# Patient Record
Sex: FEMALE | Race: WHITE | NOT HISPANIC OR LATINO | Employment: FULL TIME | URBAN - METROPOLITAN AREA
[De-identification: names, ages, dates, MRNs, and addresses within clinical notes are randomized per-mention and may not be internally consistent; named-entity substitution may affect disease eponyms.]

---

## 2022-10-17 ENCOUNTER — APPOINTMENT (OUTPATIENT)
Dept: ULTRASOUND IMAGING | Facility: CLINIC | Age: 57
End: 2022-10-17
Attending: EMERGENCY MEDICINE
Payer: COMMERCIAL

## 2022-10-17 ENCOUNTER — HOSPITAL ENCOUNTER (EMERGENCY)
Facility: CLINIC | Age: 57
Discharge: HOME | End: 2022-10-17
Attending: EMERGENCY MEDICINE | Admitting: EMERGENCY MEDICINE
Payer: COMMERCIAL

## 2022-10-17 VITALS
HEART RATE: 65 BPM | WEIGHT: 175 LBS | OXYGEN SATURATION: 99 % | DIASTOLIC BLOOD PRESSURE: 67 MMHG | BODY MASS INDEX: 26.52 KG/M2 | RESPIRATION RATE: 16 BRPM | SYSTOLIC BLOOD PRESSURE: 123 MMHG | TEMPERATURE: 97.5 F | HEIGHT: 68 IN

## 2022-10-17 DIAGNOSIS — R10.11 RUQ ABDOMINAL PAIN: ICD-10-CM

## 2022-10-17 DIAGNOSIS — N39.0 URINARY TRACT INFECTION WITHOUT HEMATURIA, SITE UNSPECIFIED: ICD-10-CM

## 2022-10-17 LAB
ALBUMIN SERPL-MCNC: 3.8 G/DL (ref 3.5–5)
ALBUMIN UR-MCNC: NEGATIVE MG/DL
ALP SERPL-CCNC: 127 U/L (ref 45–120)
ALT SERPL W P-5'-P-CCNC: 27 U/L (ref 0–45)
ANION GAP SERPL CALCULATED.3IONS-SCNC: 9 MMOL/L (ref 5–18)
APPEARANCE UR: CLEAR
AST SERPL W P-5'-P-CCNC: 15 U/L (ref 0–40)
BACTERIA #/AREA URNS HPF: ABNORMAL /HPF
BILIRUB DIRECT SERPL-MCNC: <0.1 MG/DL
BILIRUB SERPL-MCNC: 0.3 MG/DL (ref 0–1)
BILIRUB UR QL STRIP: NEGATIVE
BUN SERPL-MCNC: 19 MG/DL (ref 8–22)
CALCIUM SERPL-MCNC: 9 MG/DL (ref 8.5–10.5)
CHLORIDE BLD-SCNC: 107 MMOL/L (ref 98–107)
CO2 SERPL-SCNC: 25 MMOL/L (ref 22–31)
COLOR UR AUTO: COLORLESS
CREAT SERPL-MCNC: 0.82 MG/DL (ref 0.6–1.1)
ERYTHROCYTE [DISTWIDTH] IN BLOOD BY AUTOMATED COUNT: 14 % (ref 10–15)
GFR SERPL CREATININE-BSD FRML MDRD: 84 ML/MIN/1.73M2
GLUCOSE BLD-MCNC: 107 MG/DL (ref 70–125)
GLUCOSE UR STRIP-MCNC: NEGATIVE MG/DL
HCT VFR BLD AUTO: 38.8 % (ref 35–47)
HGB BLD-MCNC: 12.9 G/DL (ref 11.7–15.7)
HGB UR QL STRIP: NEGATIVE
KETONES UR STRIP-MCNC: NEGATIVE MG/DL
LEUKOCYTE ESTERASE UR QL STRIP: ABNORMAL
LIPASE SERPL-CCNC: 27 U/L (ref 0–52)
MCH RBC QN AUTO: 29.9 PG (ref 26.5–33)
MCHC RBC AUTO-ENTMCNC: 33.2 G/DL (ref 31.5–36.5)
MCV RBC AUTO: 90 FL (ref 78–100)
NITRATE UR QL: NEGATIVE
PH UR STRIP: 5 [PH] (ref 5–7)
PLATELET # BLD AUTO: 350 10E3/UL (ref 150–450)
POTASSIUM BLD-SCNC: 4.4 MMOL/L (ref 3.5–5)
PROT SERPL-MCNC: 7.5 G/DL (ref 6–8)
RBC # BLD AUTO: 4.32 10E6/UL (ref 3.8–5.2)
RBC URINE: 3 /HPF
SODIUM SERPL-SCNC: 141 MMOL/L (ref 136–145)
SP GR UR STRIP: 1.01 (ref 1–1.03)
SQUAMOUS EPITHELIAL: 4 /HPF
UROBILINOGEN UR STRIP-MCNC: <2 MG/DL
WBC # BLD AUTO: 8.8 10E3/UL (ref 4–11)
WBC URINE: 38 /HPF

## 2022-10-17 PROCEDURE — 76705 ECHO EXAM OF ABDOMEN: CPT

## 2022-10-17 PROCEDURE — 87086 URINE CULTURE/COLONY COUNT: CPT | Performed by: EMERGENCY MEDICINE

## 2022-10-17 PROCEDURE — 96375 TX/PRO/DX INJ NEW DRUG ADDON: CPT

## 2022-10-17 PROCEDURE — 81001 URINALYSIS AUTO W/SCOPE: CPT | Performed by: EMERGENCY MEDICINE

## 2022-10-17 PROCEDURE — 83690 ASSAY OF LIPASE: CPT | Performed by: EMERGENCY MEDICINE

## 2022-10-17 PROCEDURE — 85014 HEMATOCRIT: CPT | Performed by: EMERGENCY MEDICINE

## 2022-10-17 PROCEDURE — 250N000011 HC RX IP 250 OP 636: Performed by: EMERGENCY MEDICINE

## 2022-10-17 PROCEDURE — 36415 COLL VENOUS BLD VENIPUNCTURE: CPT | Performed by: EMERGENCY MEDICINE

## 2022-10-17 PROCEDURE — 96374 THER/PROPH/DIAG INJ IV PUSH: CPT

## 2022-10-17 PROCEDURE — 99285 EMERGENCY DEPT VISIT HI MDM: CPT | Mod: 25

## 2022-10-17 PROCEDURE — 82248 BILIRUBIN DIRECT: CPT | Performed by: EMERGENCY MEDICINE

## 2022-10-17 RX ORDER — ONDANSETRON 2 MG/ML
4 INJECTION INTRAMUSCULAR; INTRAVENOUS ONCE
Status: COMPLETED | OUTPATIENT
Start: 2022-10-17 | End: 2022-10-17

## 2022-10-17 RX ORDER — FAMOTIDINE 20 MG/1
20 TABLET, FILM COATED ORAL 2 TIMES DAILY
Qty: 20 TABLET | Refills: 0 | Status: SHIPPED | OUTPATIENT
Start: 2022-10-17 | End: 2023-02-13

## 2022-10-17 RX ORDER — CIPROFLOXACIN 500 MG/1
500 TABLET, FILM COATED ORAL 2 TIMES DAILY
Qty: 14 TABLET | Refills: 0 | Status: SHIPPED | OUTPATIENT
Start: 2022-10-17 | End: 2022-10-24

## 2022-10-17 RX ORDER — MORPHINE SULFATE 4 MG/ML
4 INJECTION, SOLUTION INTRAMUSCULAR; INTRAVENOUS ONCE
Status: COMPLETED | OUTPATIENT
Start: 2022-10-17 | End: 2022-10-17

## 2022-10-17 RX ADMIN — MORPHINE SULFATE 4 MG: 4 INJECTION, SOLUTION INTRAMUSCULAR; INTRAVENOUS at 05:59

## 2022-10-17 RX ADMIN — ONDANSETRON 4 MG: 2 INJECTION INTRAMUSCULAR; INTRAVENOUS at 05:59

## 2022-10-17 ASSESSMENT — ACTIVITIES OF DAILY LIVING (ADL): ADLS_ACUITY_SCORE: 35

## 2022-10-17 NOTE — ED PROVIDER NOTES
EMERGENCY DEPARTMENT ENCOUnter      NAME: Nan Sánchez  AGE: 57 year old female  YOB: 1965  MRN: [id number]  EVALUATION DATE & TIME: 10/17/2022  5:29 AM    PCP: No Ref-Primary, Physician    ED PROVIDER: Mohinder Deras DO      Chief Complaint   Patient presents with     Rib Pain         FINAL IMPRESSION:  1. RUQ abdominal pain          ED COURSE & MEDICAL DECISION MAKIN:39 AM I met with the patient to gather history and to perform my initial exam. I discussed the plan for care while in the Emergency Department.  6:00 AM Patient is signed out to shift change provider pending workup.     The patient presented to the emergency department today with complaints of right upper quadrant pain for the past day.  She has moderate tenderness to this area on exam.  Laboratory tests and ultrasound have been ordered and are pending at the time of signout to the oncoming provider.  See their note for further details.      Medical Decision Making    Supplemental history from: N/A    External Record(s) Reviewed: N/A    Differential Diagnosis: See MDM charting for differential considered.     I performed an independent interpretation of the: N/A    Discussed with radiology regarding test interpretation: N/A    Discussion of management with another provider: N/A    The following testing was considered but ultimately not selected: None    I considered prescription management with: N/A    The patient's care impacted: None    Consideration of Admission/Observation: N/A - Patient admitted or discharged without consideration for admission    Care significantly affected by Social Determinants of Health including: N/A       At the conclusion of the encounter I discussed the results of all of the tests and the disposition. The questions were answered. The patient or family acknowledged understanding and was agreeable with the care plan.         MEDICATIONS GIVEN IN THE EMERGENCY:  Medications   morphine (PF) injection  "4 mg (has no administration in time range)   ondansetron (ZOFRAN) injection 4 mg (has no administration in time range)         =================================================================    HPI        Nan Sánchez is a 57 year old female with no recorded pertinent history at this time who presents to this ED by walk in for evaluation of rib pain. Patient presents with right rib pain that has worsened in the past 24 hours. Pain is constant and is not affected by eating or deep inspirations. Denies history of abdominal surgeries or allergies to medications. Patient denies cough, fever, nausea, vomiting, new back pain, shoulder pain, or any additional complaints at this time.     REVIEW OF SYSTEMS     Constitutional:  Denies fever or chills  HENT:  Denies sore throat   Respiratory:  Denies cough or shortness of breath   Cardiovascular:  Denies chest pain or palpitations  GI:  Denies abdominal pain, nausea, or vomiting  Musculoskeletal:  Denies any new extremity pain. Endorses rib pain.    Skin:  Denies rash   Neurologic:  Denies headache, focal weakness or sensory changes    All other systems reviewed and are negative      PAST MEDICAL HISTORY:  History reviewed. No pertinent past medical history.    PAST SURGICAL HISTORY:  History reviewed. No pertinent surgical history.        CURRENT MEDICATIONS:    No current outpatient medications on file.      ALLERGIES:  No Known Allergies    FAMILY HISTORY:  History reviewed. No pertinent family history.    SOCIAL HISTORY:        VITALS:  Patient Vitals for the past 24 hrs:   BP Temp Temp src Pulse Resp SpO2 Height Weight   10/17/22 0526 (!) 143/73 97.5  F (36.4  C) Oral 72 16 96 % 1.727 m (5' 8\") 79.4 kg (175 lb)       PHYSICAL EXAM    Constitutional:  Well developed, Well nourished, Uncomfortable appearing.   HENT:  Normocephalic, Atraumatic, Bilateral external ears normal, Oropharynx moist, Nose normal.   Neck:  Normal range of motion, No meningismus, No stridor. "   Eyes:  EOMI, Conjunctiva normal, No discharge.   Respiratory:  Normal breath sounds, No respiratory distress, No wheezing, No chest tenderness.   Cardiovascular:  Normal heart rate, Normal rhythm, No murmurs  GI:  Soft, Moderate RUQ tenderness, No lower abdominal tenderness, No guarding, No CVA tenderness.   Musculoskeletal:  No tenderness to palpation or major deformities noted.   Integument:  Warm, Dry, No erythema, No rash.   Neurologic:  Alert & oriented x 3, Normal motor function, Normal sensory function, No focal deficits noted.   Psychiatric:  Affect normal, Judgment normal, Mood normal.            I, Saran Montana, am serving as a scribe to document services personally performed by Dr. Deras based on my observation and the provider's statements to me. I, Mohinder Deras, DO attest that Saran Montana is acting in a scribe capacity, has observed my performance of the services and has documented them in accordance with my direction.    Mohinder Deras DO  Emergency Medicine  [facility] EMERGENCY ROOM  [address] [facility] [id number] [phone]  Dept: [phone]     Mohinder Deras MD  10/17/22 0682

## 2022-10-17 NOTE — ED NOTES
EMERGENCY DEPARTMENT SIGN OUT NOTE        ED COURSE AND MEDICAL DECISION MAKING  Patient was signed out to me by Dr Mohinder Deras at 6:00 AM    In brief, Nan Sánchez is a 57 year old female who initially presented with right rib pain worsening over the past 24 hours. Pain is constant with no aggravating or alleviating factors, no history of abdominal surgeries.     At time of sign out, disposition was pending right upper quadrant ultrasound labs and urinalysis.  Patient was reevaluated here and was resting much more comfortably after receiving some IV analgesics.  Her right upper quadrant ultrasound was unremarkable.  Labs including liver function tests, white blood cell count, hemoglobin, and metabolic profile are stable and reassuring.  Urinalysis does show some pyuria and minimal hematuria.  We discussed possibility of CT imaging to further evaluate for anything like appendicitis or kidney stone.  These do not seem to be as likely based on the locality of her pain.  I did suggest that given the pyuria we probably ought to treat her for a cystitis/possible pyelonephritis and she was in agreement with this.  She would rather hold off on the CT scan at this point citing concerns about cost and insurance and I think this is not unreasonable.  With this knowledge we did discuss return to the emergency department if there are new concerns about increasing pain, fever, tractable vomiting, etc.  Patient should follow-up in clinic to review ongoing symptoms by next week.  She was discharged in stable condition peer    FINAL IMPRESSION    1. RUQ abdominal pain    2. Urinary tract infection without hematuria, site unspecified        ED MEDS  Medications   morphine (PF) injection 4 mg (4 mg Intravenous Given 10/17/22 0559)   ondansetron (ZOFRAN) injection 4 mg (4 mg Intravenous Given 10/17/22 0559)       LAB  Labs Ordered and Resulted from Time of ED Arrival to Time of ED Departure   HEPATIC FUNCTION PANEL - Abnormal        Result Value    Bilirubin Total 0.3      Bilirubin Direct <0.1      Protein Total 7.5      Albumin 3.8      Alkaline Phosphatase 127 (*)     AST 15      ALT 27     ROUTINE UA WITH MICROSCOPIC REFLEX TO CULTURE - Abnormal    Color Urine Colorless      Appearance Urine Clear      Glucose Urine Negative      Bilirubin Urine Negative      Ketones Urine Negative      Specific Gravity Urine 1.012      Blood Urine Negative      pH Urine 5.0      Protein Albumin Urine Negative      Urobilinogen Urine <2.0      Nitrite Urine Negative      Leukocyte Esterase Urine 500 Sang/uL (*)     Bacteria Urine Few (*)     RBC Urine 3 (*)     WBC Urine 38 (*)     Squamous Epithelials Urine 4 (*)    CBC WITH PLATELETS - Normal    WBC Count 8.8      RBC Count 4.32      Hemoglobin 12.9      Hematocrit 38.8      MCV 90      MCH 29.9      MCHC 33.2      RDW 14.0      Platelet Count 350     BASIC METABOLIC PANEL - Normal    Sodium 141      Potassium 4.4      Chloride 107      Carbon Dioxide (CO2) 25      Anion Gap 9      Urea Nitrogen 19      Creatinine 0.82      Calcium 9.0      Glucose 107      GFR Estimate 84     LIPASE - Normal    Lipase 27     URINE CULTURE         RADIOLOGY    US Abdomen Limited   Final Result   IMPRESSION:   1.  Diffuse increased echogenicity in the liver consistent with hepatic steatosis. No focal mass or biliary dilatation.   2.  Normal sonographic appearance of the gallbladder and visualized pancreas. No ascites. No hydronephrosis in the right kidney.                DISCHARGE MEDS  Discharge Medication List as of 10/17/2022  8:48 AM      START taking these medications    Details   ciprofloxacin (CIPRO) 500 MG tablet Take 1 tablet (500 mg) by mouth 2 times daily for 7 days, Disp-14 tablet, R-0, Local Print      famotidine (PEPCID) 20 MG tablet Take 1 tablet (20 mg) by mouth 2 times daily, Disp-20 tablet, R-0, Local Print             Osmel Howell MD  [facility] EMERGENCY ROOM  [address]  [facility] [id number]  [phone]      Osmel Howell MD  10/17/22 8571

## 2022-10-17 NOTE — DISCHARGE INSTRUCTIONS
You were seen in the emergency department at [facility] for right-sided abdominal pain and lower rib pain.  Your evaluation included an ultrasound of her gallbladder which looked normal.  It also included extensive lab studies which have all been reassuring so far.  Your urine testing did show some signs of infection and we would like to treat you with a few days of antibiotics for a possible kidney/bladder infection which could be causing your abdominal pain.  We would also like you to continue using Tylenol and ibuprofen every 6 hours for pain and we are going to prescribe you an antacid called famotidine to help with any possible contribution of stomach acid.  If symptoms significantly worsen within the next day or 2, we need to reevaluate you in the emergency department.  If things are not getting better within the next week or 2 please contact your primary doctor and follow-up with them upon your return to [place].

## 2022-10-17 NOTE — ED TRIAGE NOTES
Pt states that yesterday evening she started to feel pain under her rib. Denies abdomen pain or chest pain. Denies injury. States the pain has increased throughout the night. Denies nausea or dysuria.

## 2022-10-18 LAB — BACTERIA UR CULT: NO GROWTH

## 2023-02-13 ENCOUNTER — HOSPITAL ENCOUNTER (OUTPATIENT)
Dept: CT IMAGING | Facility: CLINIC | Age: 58
Discharge: HOME | End: 2023-02-13
Attending: PHYSICIAN ASSISTANT
Payer: COMMERCIAL

## 2023-02-13 ENCOUNTER — OFFICE VISIT (OUTPATIENT)
Dept: FAMILY MEDICINE | Facility: CLINIC | Age: 58
End: 2023-02-13
Payer: COMMERCIAL

## 2023-02-13 VITALS
HEART RATE: 78 BPM | BODY MASS INDEX: 27.22 KG/M2 | DIASTOLIC BLOOD PRESSURE: 84 MMHG | TEMPERATURE: 98.1 F | OXYGEN SATURATION: 99 % | WEIGHT: 179 LBS | SYSTOLIC BLOOD PRESSURE: 119 MMHG | RESPIRATION RATE: 16 BRPM
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
## 2023-02-13 DIAGNOSIS — S06.0X0A CONCUSSION WITHOUT LOSS OF CONSCIOUSNESS, INITIAL ENCOUNTER: Primary | ICD-10-CM

## 2023-02-13 PROCEDURE — 70450 CT HEAD/BRAIN W/O DYE: CPT

## 2023-02-13 PROCEDURE — 99214 OFFICE O/P EST MOD 30 MIN: CPT | Performed by: PHYSICIAN ASSISTANT

## 2023-02-13 PROCEDURE — 72125 CT NECK SPINE W/O DYE: CPT

## 2023-02-13 RX ORDER — BETAMETHASONE VALERATE 1.2 MG/G
AEROSOL, FOAM TOPICAL
COMMUNITY
Start: 2022-06-02 | End: 2024-08-14

## 2023-02-13 RX ORDER — HYDROCODONE BITARTRATE AND ACETAMINOPHEN 10; 325 MG/1; MG/1
TABLET ORAL
COMMUNITY
Start: 2022-12-15 | End: 2024-08-12

## 2023-02-13 RX ORDER — GABAPENTIN 100 MG/1
CAPSULE ORAL AT BEDTIME
COMMUNITY
Start: 2022-07-14 | End: 2024-08-14

## 2023-02-13 NOTE — PATIENT INSTRUCTIONS
We believe a concussion is the cause of your headache and symptoms today.   This is a clinical diagnosis.      For your pain, please use Ibuprofen 600mg every 6 hours as needed for pain.  You may also take Tylenol 500 mg every 4-6 hours as needed for additional pain relief.    Concerning signs and symptoms to watch for: severe headache, persistent nausea/vomiting, not acting right, new weakness in any extremity, facial asymmetry, or speech difficulties.      Cognitive rest is important following a concussion.  Limit anything requiring prolonged concentration. This can include reading, video games, television, or any screen time including text messaging, social media. In addition, the patient should refrain from any contact sports until completely asymptomatic at rest or with activity for 1 week.      Please take your medicines as recommended above and review the discharge instructions for concerning signs/symptoms that would require your prompt return to the clinic/emergency department for further evaluation. Please follow up in clinic as we have recommended below.      Discharge Instructions for Concussion  You have been diagnosed with a concussion, a type of brain injury caused by a sudden impact to your head. It can also be caused by sudden movement of your brain inside your head, such as from forceful shaking. Some concussions are mild. Most people recover completely from mild concussions. But recovery may take days, weeks, or months. For some, symptoms may last even longer. Early care and monitoring are important to prevent long-term complications.  Home care  Do's and don'ts:   Ask a friend or family member to stay with you for a few days. You should not be alone until you know how the injury has affected you.  Tell your caregiver to wake you every 2 to 3 hours during the first night. Your caregiver should call 911 if he or she can t wake you, or if you are confused.  Don t take any medicine--not even  aspirin--unless your healthcare provider says it's OK. If you have a headache, try placing a cold, damp cloth on your forehead.  Eat light. Clear liquids, such as broth or gelatin, are a good choice.  Don't drink alcohol or use any recreational drugs.   Don't return to sports or any activity that could cause you to hit your head until all symptoms are gone and you have been cleared by your doctor. A second head injury before full recovery from the first one can lead to serious brain injury.  Avoid activities that require a lot of concentration or attention. This will allow your brain to rest and heal more quickly.  The best way to recover is to discuss symptoms with your healthcare provider and your family. Work closely with your healthcare provider and give your brain time to heal.  Follow-up care  Follow up with your healthcare provider, or as advised.      When to call your healthcare provider  Your caregiver should call 911 right away if you have fallen asleep, cannot be awakened, or you are confused.  Otherwise, call your healthcare provider right away if any of these occur:  Vomiting  Clear or bloody drainage from your nose or ear  Constant drowsiness or trouble waking up  Confusion or memory loss  Blurred vision  Trouble walking, talking, or concentrating  Increased weakness or problems with coordination  Constant headache that can t be relieved or gets worse  Changes in behavior or personality   Date Last Reviewed: 11/5/2015 2000-2016 [employer]. [address] [place] [address], [place] [address]. All rights reserved. This information is not intended as a substitute for professional medical care. Always follow your healthcare professional's instructions.

## 2023-02-13 NOTE — PROGRESS NOTES
Patient presents with:  Fall: Patient fell x2 days ago on ice and hit head, left arm pain and dizziness        Clinical Decision Making:  Patient had fall 2 days ago.  Patient now experiencing difficulty with balance.  Romberg was mildly positive and patient struggled to do tandem walking.  Due to these coordination deficits I did recommend CT today.  CT of head and neck are neg for acute traumatic changes. Suspect mild concussion.      ICD-10-CM    1. Fall, initial encounter  W19.XXXA CT Head w/o Contrast     CT Cervical Spine w/o Contrast      2. Concussion without loss of consciousness, initial encounter  S06.0X0A           Patient Instructions     We believe a concussion is the cause of your headache and symptoms today.   This is a clinical diagnosis.      For your pain, please use Ibuprofen 600mg every 6 hours as needed for pain.  You may also take Tylenol 500 mg every 4-6 hours as needed for additional pain relief.    Concerning signs and symptoms to watch for: severe headache, persistent nausea/vomiting, not acting right, new weakness in any extremity, facial asymmetry, or speech difficulties.      Cognitive rest is important following a concussion.  Limit anything requiring prolonged concentration. This can include reading, video games, television, or any screen time including text messaging, social media. In addition, the patient should refrain from any contact sports until completely asymptomatic at rest or with activity for 1 week.      Please take your medicines as recommended above and review the discharge instructions for concerning signs/symptoms that would require your prompt return to the clinic/emergency department for further evaluation. Please follow up in clinic as we have recommended below.      Discharge Instructions for Concussion  You have been diagnosed with a concussion, a type of brain injury caused by a sudden impact to your head. It can also be caused by sudden movement of your brain  inside your head, such as from forceful shaking. Some concussions are mild. Most people recover completely from mild concussions. But recovery may take days, weeks, or months. For some, symptoms may last even longer. Early care and monitoring are important to prevent long-term complications.  Home care  Do's and don'ts:     Ask a friend or family member to stay with you for a few days. You should not be alone until you know how the injury has affected you.    Tell your caregiver to wake you every 2 to 3 hours during the first night. Your caregiver should call 911 if he or she can t wake you, or if you are confused.    Don t take any medicine--not even aspirin--unless your healthcare provider says it's OK. If you have a headache, try placing a cold, damp cloth on your forehead.    Eat light. Clear liquids, such as broth or gelatin, are a good choice.    Don't drink alcohol or use any recreational drugs.     Don't return to sports or any activity that could cause you to hit your head until all symptoms are gone and you have been cleared by your doctor. A second head injury before full recovery from the first one can lead to serious brain injury.    Avoid activities that require a lot of concentration or attention. This will allow your brain to rest and heal more quickly.  The best way to recover is to discuss symptoms with your healthcare provider and your family. Work closely with your healthcare provider and give your brain time to heal.  Follow-up care  Follow up with your healthcare provider, or as advised.      When to call your healthcare provider  Your caregiver should call 911 right away if you have fallen asleep, cannot be awakened, or you are confused.  Otherwise, call your healthcare provider right away if any of these occur:    Vomiting    Clear or bloody drainage from your nose or ear    Constant drowsiness or trouble waking up    Confusion or memory loss    Blurred vision    Trouble walking, talking, or  concentrating    Increased weakness or problems with coordination    Constant headache that can t be relieved or gets worse    Changes in behavior or personality   Date Last Reviewed: 11/5/2015 2000-2016 [employer]. [address] [place] [address], [place] [address]. All rights reserved. This information is not intended as a substitute for professional medical care. Always follow your healthcare professional's instructions.        HPI:  Nan Sánchez is a 57 year old female who presents today complaining of fall that occurred 2 days ago.  Patient slipped backwards while walking on ice and hit her head.  Patient also experiencing left arm discomfort.  Since the fall she has had intermittent dizziness that started last night and today. She felt out of body for a moment, but there was no LOC. No previous concussions. Patient has been taking Tylenol. She has a constant dull head and neck pain.     History obtained from the patient.    Problem List:  There are no relevant problems documented for this patient.      No past medical history on file.    Social History     Tobacco Use     Smoking status: Not on file     Smokeless tobacco: Not on file   Substance Use Topics     Alcohol use: Not on file       Review of Systems    Vitals:    02/13/23 1006   BP: 119/84   BP Location: Right arm   Patient Position: Sitting   Cuff Size: Adult Large   Pulse: 78   Resp: 16   Temp: 98.1  F (36.7  C)   TempSrc: Oral   SpO2: 99%   Weight: 81.2 kg (179 lb)       Physical Exam  Vitals and nursing note reviewed.   Constitutional:       General: She is not in acute distress.     Appearance: She is not toxic-appearing or diaphoretic.   HENT:      Head: Normocephalic and atraumatic.      Right Ear: Tympanic membrane, ear canal and external ear normal. No hemotympanum.      Left Ear: Tympanic membrane, ear canal and external ear normal. No hemotympanum.   Eyes:      Extraocular Movements: Extraocular movements intact.       Conjunctiva/sclera: Conjunctivae normal.      Pupils: Pupils are equal, round, and reactive to light.   Pulmonary:      Effort: Pulmonary effort is normal. No respiratory distress.   Musculoskeletal:      Comments: Patient moves her entire body to face me so that she does not need to turn her head to the right to make eye contact.  She reports this is due to her neck pain.   Skin:     Comments: Small scalp hematoma present on the left posterior part of the scalp.  No break in the skin.   Neurological:      Mental Status: She is alert.      GCS: GCS eye subscore is 4. GCS verbal subscore is 5. GCS motor subscore is 6.      Cranial Nerves: Cranial nerves 2-12 are intact. No cranial nerve deficit or facial asymmetry.      Sensory: Sensation is intact.      Motor: No weakness, tremor, atrophy, seizure activity or pronator drift.      Coordination: Romberg sign positive. Finger-Nose-Finger Test and Heel to Shin Test normal. Rapid alternating movements normal.      Gait: Tandem walk abnormal. Gait normal.   Psychiatric:         Mood and Affect: Mood normal.         Behavior: Behavior normal.         Thought Content: Thought content normal.         Judgment: Judgment normal.         Results:  Results for orders placed or performed during the hospital encounter of 02/13/23   CT Head w/o Contrast     Status: None    Narrative    EXAM: CT HEAD W/O CONTRAST  LOCATION: [facility]  DATE/TIME: 2/13/2023 11:12 AM    INDICATION: Head injury.  COMPARISON: None.  TECHNIQUE: Routine CT Head without IV contrast. Multiplanar reformats. Dose reduction techniques were used.    FINDINGS:  INTRACRANIAL CONTENTS: No intracranial hemorrhage, extraaxial collection, or mass effect.  No CT evidence of acute infarct. Normal parenchymal attenuation. Normal ventricles and sulci.     VISUALIZED ORBITS/SINUSES/MASTOIDS: No intraorbital abnormality. No paranasal sinus mucosal disease. No middle ear or mastoid  effusion.    BONES/SOFT TISSUES: No acute abnormality.      Impression    IMPRESSION:  1.  No acute intracranial process.   Results for orders placed or performed during the hospital encounter of 02/13/23   CT Cervical Spine w/o Contrast     Status: None    Narrative    EXAM: CT CERVICAL SPINE W/O CONTRAST  LOCATION: [facility]  DATE/TIME: 2/13/2023 11:12 AM    INDICATION: Neck pain; Trauma; Spondyloarthropathy  COMPARISON: None.  TECHNIQUE: Routine CT Cervical Spine without IV contrast. Multiplanar reformats. Dose reduction techniques were used.    FINDINGS:  VERTEBRA: Cervical vertebral body heights are maintained. The cervical spinal canal is mildly narrowed on a developmental basis. No acute cervical spine fracture.     CANAL/FORAMINA: Bilateral neural foraminal stenosis at C3-C4. Severe left neural foraminal stenosis at C4-C5. Severe left neural foraminal stenosis at C5-C6.    PARASPINAL: No extraspinal abnormality.      Impression    IMPRESSION:  1.  No acute cervical spine fracture.         At the end of the encounter, I discussed results, diagnosis, medications. Discussed red flags for immediate return to clinic/ER, as well as indications for follow up if no improvement. Patient understood and agreed to plan. Patient was stable for discharge.    30 minutes spent on the date of the encounter doing chart review, history and examination, documentation, and further activities as noted.

## 2023-07-06 ENCOUNTER — TELEPHONE (OUTPATIENT)
Dept: FAMILY MEDICINE | Facility: CLINIC | Age: 58
End: 2023-07-06

## 2023-07-06 ENCOUNTER — OFFICE VISIT (OUTPATIENT)
Dept: FAMILY MEDICINE | Facility: CLINIC | Age: 58
End: 2023-07-06
Payer: COMMERCIAL

## 2023-07-06 VITALS
WEIGHT: 180 LBS | TEMPERATURE: 97.8 F | RESPIRATION RATE: 16 BRPM | HEART RATE: 71 BPM | SYSTOLIC BLOOD PRESSURE: 113 MMHG | BODY MASS INDEX: 27.37 KG/M2 | DIASTOLIC BLOOD PRESSURE: 74 MMHG | OXYGEN SATURATION: 95 %

## 2023-07-06 DIAGNOSIS — L50.9 HIVES: Primary | ICD-10-CM

## 2023-07-06 PROCEDURE — 99214 OFFICE O/P EST MOD 30 MIN: CPT | Performed by: PHYSICIAN ASSISTANT

## 2023-07-06 RX ORDER — ACETAMINOPHEN AND CODEINE PHOSPHATE 300; 30 MG/1; MG/1
1 TABLET ORAL EVERY 6 HOURS PRN
Qty: 12 TABLET | Refills: 0 | Status: SHIPPED | OUTPATIENT
Start: 2023-07-06 | End: 2023-07-09

## 2023-07-06 RX ORDER — TRIAMCINOLONE ACETONIDE 1 MG/G
OINTMENT TOPICAL 2 TIMES DAILY
Qty: 30 G | Refills: 0 | Status: SHIPPED | OUTPATIENT
Start: 2023-07-06 | End: 2023-07-20

## 2023-07-06 NOTE — PATIENT INSTRUCTIONS
Atopic dermatitis.  I recommend using moisturizing lotion such as Aquaphor, Aveeno,or Eucerin.  Avoid the typical baby lotions.  Apply topical steroid to affected areas of skin once to twice per day. Do not use these for more than 14 days in a row.   Follow-up in a couple weeks if not improving and sooner if worse.  Take 20 mg of Zyrtec in the morning and Benadryl at night.   Take Tylenol #3 for severe pain relief.   Follow up if growing redness or concern for secondary bacterial infection.

## 2023-07-06 NOTE — PROGRESS NOTES
Patient presents with:  Derm Problem: Itchy rash on lower legs x 1 week.      Clinical Decision Making:  I suspect the patient had insect bites and on top of that eczema flareup.  Patient was prescribed topical triamcinolone ointment and she was instructed to discontinue the Caladryl spray, as I think it is drying her skin out.  Instead I recommend using Aquaphor.  She may increase her Zyrtec dose.  Patient was informed that unfortunately we do not have injectable Benadryl.  No findings concerning for scabies, Lyme disease, or secondary bacterial skin infections.  No vesicular contact dermatitis rashes concerning for poison ivy.      ICD-10-CM    1. Hives  L50.9 triamcinolone (KENALOG) 0.1 % external ointment          Patient Instructions   Atopic dermatitis.    I recommend using moisturizing lotion such as Aquaphor or Eucerin.  Avoid the typical baby lotions.    Apply topical steroid to affected areas of skin once to twice per day. Do not use these for more than 5 days in a row.     Follow-up in a couple weeks if not improving and sooner if worse.  What is Atopic Dermatitis?  Atopic dermatitis (eczema) causes chronic skin irritation and is frequently found in infants, teens, and adults. This disease is often genetic (runs in families). It is linked with allergies, such as hay fever and sometimes asthma. Patches of skin become dry, red, itchy, and scaly.  In older adults, dry skin is often called xerosis. Sometimes, eczema is limited to the hands or feet. If often improves when the skin is well hydrated and gets worse when the skin is dry. You can help control its symptoms by practicing good self-care and avoiding anything that causes flare-ups (such as sunburn or vigorous scratching).  Where do you have symptoms?  Atopic dermatitis symptoms can appear anywhere on the body. But, in most cases, they vary based on the patient s age. In infants, irritation appears frequently on the cheeks, near the mouth, and under the  eyelids. In children aged 2 through 10, skin folds, such as the backs of the knees, or in the arm crease, are most often affected. In children 11 and older and in adults, symptoms can affect multiple areas.  What triggers symptoms?  Atopic dermatitis symptoms flare because of many factors. These include skin dryness, scratching, stress, harsh soaps, and allergens, such as dust or wool. Try to avoid anything that causes flare-ups.    Recognizing what causes flare-ups  To pinpoint what causes atopic dermatitis to flare, keep a list of factors that seem to affect your skin. Start by filling in the spaces below. Then, keep writing them down in a notebook or diary. The factors that affect each person vary. So, keep your own list and try to avoid your triggers. A good starting place for treatment for anyone with dry skin is to use a daily moisturizer.        HPI:  Nan Sánchez is a 57 year old female who presents today complaining of bug bite on left shin that was very itchy so she treated with an anti-itch spray.  She denies more of a diffuse rash on both shins is very itchy.  She has been taking once daily Zyrtec and applying topical betamethasone foam to the shins.  The foam was from previous rash on scalp.  She now has a few welts on her face as well.  In the past she was given injection of Benadryl which was very helpful.    History obtained from the patient.    Problem List:  There are no relevant problems documented for this patient.      No past medical history on file.    Social History     Tobacco Use     Smoking status: Never     Smokeless tobacco: Never   Substance Use Topics     Alcohol use: Not on file       Review of Systems    Vitals:    07/06/23 1040   BP: 113/74   Pulse: 71   Resp: 16   Temp: 97.8  F (36.6  C)   TempSrc: Oral   SpO2: 95%   Weight: 81.6 kg (180 lb)       Physical Exam  Vitals and nursing note reviewed.   Constitutional:       General: She is not in acute distress.     Appearance: She is  not toxic-appearing or diaphoretic.   HENT:      Head: Normocephalic and atraumatic.      Right Ear: External ear normal.      Left Ear: External ear normal.   Eyes:      Conjunctiva/sclera: Conjunctivae normal.   Pulmonary:      Effort: Pulmonary effort is normal. No respiratory distress.   Skin:     Comments: Skin on the shins is very dry appearing.  There is a few insect bites along with patches of erythematous and bumpy skin.  Patient has history of eczema.   Neurological:      Mental Status: She is alert.   Psychiatric:         Mood and Affect: Mood normal.         Behavior: Behavior normal.         Thought Content: Thought content normal.         Judgment: Judgment normal.     At the end of the encounter, I discussed results, diagnosis, medications. Discussed red flags for immediate return to clinic/ER, as well as indications for follow up if no improvement. Patient understood and agreed to plan. Patient was stable for discharge.    30 minutes spent on the date of the encounter doing chart review, history and examination, documentation, and further activities as noted.

## 2023-07-06 NOTE — TELEPHONE ENCOUNTER
Reason for Call:  Other     Detailed comments: patient calling reporting that her bug bite swelling is getting worse. Patient was seen in [facility] today and is requesting a prednisone injection.    Phone Number Patient can be reached at: Home number on file [phone] (home)    Best Time: Any    Can we leave a detailed message on this number? YES    Call taken on 7/6/2023 at 4:48 PM by Stephanie Meehan CMA

## 2023-07-07 ENCOUNTER — HOSPITAL ENCOUNTER (EMERGENCY)
Facility: CLINIC | Age: 58
Discharge: HOME | End: 2023-07-07
Attending: EMERGENCY MEDICINE | Admitting: EMERGENCY MEDICINE
Payer: COMMERCIAL

## 2023-07-07 ENCOUNTER — APPOINTMENT (OUTPATIENT)
Dept: ULTRASOUND IMAGING | Facility: CLINIC | Age: 58
End: 2023-07-07
Attending: EMERGENCY MEDICINE
Payer: COMMERCIAL

## 2023-07-07 VITALS
OXYGEN SATURATION: 94 % | HEART RATE: 68 BPM | TEMPERATURE: 97.1 F | DIASTOLIC BLOOD PRESSURE: 81 MMHG | SYSTOLIC BLOOD PRESSURE: 146 MMHG | BODY MASS INDEX: 27.06 KG/M2 | RESPIRATION RATE: 16 BRPM | WEIGHT: 178 LBS

## 2023-07-07 DIAGNOSIS — T78.40XA ALLERGIC REACTION, INITIAL ENCOUNTER: ICD-10-CM

## 2023-07-07 PROCEDURE — 99285 EMERGENCY DEPT VISIT HI MDM: CPT | Mod: 25

## 2023-07-07 PROCEDURE — 250N000011 HC RX IP 250 OP 636: Mod: JZ | Performed by: EMERGENCY MEDICINE

## 2023-07-07 PROCEDURE — 96375 TX/PRO/DX INJ NEW DRUG ADDON: CPT

## 2023-07-07 PROCEDURE — 96374 THER/PROPH/DIAG INJ IV PUSH: CPT

## 2023-07-07 PROCEDURE — 93971 EXTREMITY STUDY: CPT | Mod: LT

## 2023-07-07 RX ORDER — PREDNISONE 10 MG/1
TABLET ORAL
Qty: 30 TABLET | Refills: 0 | Status: SHIPPED | OUTPATIENT
Start: 2023-07-08 | End: 2023-07-18

## 2023-07-07 RX ORDER — DIPHENHYDRAMINE HYDROCHLORIDE 50 MG/ML
50 INJECTION INTRAMUSCULAR; INTRAVENOUS ONCE
Status: COMPLETED | OUTPATIENT
Start: 2023-07-07 | End: 2023-07-07

## 2023-07-07 RX ORDER — METHYLPREDNISOLONE SODIUM SUCCINATE 125 MG/2ML
125 INJECTION, POWDER, LYOPHILIZED, FOR SOLUTION INTRAMUSCULAR; INTRAVENOUS ONCE
Status: COMPLETED | OUTPATIENT
Start: 2023-07-07 | End: 2023-07-07

## 2023-07-07 RX ADMIN — DIPHENHYDRAMINE HYDROCHLORIDE 50 MG: 50 INJECTION, SOLUTION INTRAMUSCULAR; INTRAVENOUS at 06:44

## 2023-07-07 RX ADMIN — METHYLPREDNISOLONE SODIUM SUCCINATE 125 MG: 125 INJECTION, POWDER, FOR SOLUTION INTRAMUSCULAR; INTRAVENOUS at 06:44

## 2023-07-07 ASSESSMENT — ACTIVITIES OF DAILY LIVING (ADL): ADLS_ACUITY_SCORE: 35

## 2023-07-07 NOTE — ED TRIAGE NOTES
States she has gotten mosquito bites on legs, few on face and believes she is having an allergic reaction to them. Was seen in clinic yesterday and prescribed topical ointment. Pt states itchiness and burning have increased.

## 2023-07-07 NOTE — ED NOTES
Received a topical steroid medicine from the clinic yesterday and hasn't helped. Pt states she took 1 pill of Benadryl at 0200 with no itch relief either.

## 2023-07-07 NOTE — ED PROVIDER NOTES
EMERGENCY DEPARTMENT ENCOUNTER      NAME: Nan Sánchez  AGE: 57 year old female  YOB: 1965  MRN: [id number]  EVALUATION DATE & TIME: 2023  5:39 AM    PCP: System, Provider Not In    ED PROVIDER: Nestor Valdivia D.O.      Chief Complaint   Patient presents with     Insect Bite       FINAL IMPRESSION:  1. Allergic reaction, initial encounter        ED COURSE & MEDICAL DECISION MAKIN:28 AM I met with the patient to gather history and to perform my initial exam. I discussed the plan for care while in the Emergency Department.  7:35 AM Rechecked and updated the patient. Patient feels a lot better. We discussed the plan for discharge and the patient is agreeable. Reviewed supportive cares, symptomatic treatment, outpatient follow up, and reasons to return to the Emergency Department. Patient to be discharged by ED RN.           Pertinent Labs & Imaging studies reviewed. (See chart for details)  57 year old female presents to the Emergency Department for evaluation of urticaria following bug bites.  Patient does have multiple urticaria on her legs, as well as a few on her face.  She has no oral or throat symptoms or other symptoms of be suggestive of anaphylactic reaction.  She did have some tenderness behind her left knee, however there is no evidence of DVT, and I do not believe this to be a Baker's cyst or infectious process.  She did have significant improvement with IV Benadryl and Solu-Medrol.  Epinephrine was not needed.  Will be discharged on tapering dose prednisone for suspected allergic reaction to bug bites.  She was instructed otherwise follow-up with her primary care provider.  Return precautions discussed.    Medical Decision Making    History:    Supplemental history from: Documented in chart, if applicable    External Record(s) reviewed: Documented in chart, if applicable.    Work Up:    Chart documentation includes differential considered and any EKGs or imaging independently  interpreted by provider, where specified.    In additional to work up documented, I considered the following work up: Documented in chart, if applicable.    External consultation:    Discussion of management with another provider: Documented in chart, if applicable    Complicating factors:    Care impacted by chronic illness: N/A    Care affected by social determinants of health: N/A    Disposition considerations: Discharge. I prescribed additional prescription strength medication(s) as charted. I considered admission, but discharged patient after significant clinical improvement.        At the conclusion of the encounter I discussed the results of all of the tests and the disposition. The questions were answered. The patient or family acknowledged understanding and was agreeable with the care plan.        HPI    Patient information was obtained from: Patient     Use of : N/A        Nan Sánchez is a 57 year old female who presents to the ED via walk in for evaluation of insect bite.     Per chart review, the patient was seen in [facility] on 7/6/2023 presenting with hives. Patient was prescribed topical triamcinolone ointment and informed to discontinue using caladryl spray. Patient was also recommended to increase Zyrtec dose. Patient was discharged with plan for follow up in a couple of weeks if symptoms are not improving or become worse.      Patient reports mosquito bites on her bilateral lower extremities and face. States that she believes she's having an allergic reaction and reports increase swelling behind her left knee. She was seen at urgent care yesterday and has been using a steroid cream along with Benadryl and Zyrtec with mild relief. She last had Zyrtec at 12 AM this morning and Benadryl at 2 AM. She endorses itchiness on the bites and was not able to sleep last night which prompted her to present to the ED for further evaluation. Denies any sore throat or shortness of  breath. Denies any tobacco or alcohol use. Not on any medications. No history of medical problems or surgeries.       REVIEW OF SYSTEMS  Constitutional:  Denies fever, chills, weight loss or weakness  Eyes:  No pain, discharge, redness  HENT:  Denies sore throat, ear pain, congestion  Respiratory: No SOB, wheeze or cough  Cardiovascular:  No CP, palpitations  GI:  Denies abdominal pain, nausea, vomiting, diarrhea  : Denies dysuria, hematuria  Musculoskeletal:  Denies any new muscle/joint pain, or loss of function. Positive for swelling behind the left knee.   Skin:  Denies pallor. Positive for rash and itchiness.   Neurologic:  Denies headache, focal weakness or sensory changes  Lymph: Denies swollen nodes    All other systems negative unless noted in HPI.    PAST MEDICAL HISTORY:  No past medical history on file.    PAST SURGICAL HISTORY:  No past surgical history on file.      CURRENT MEDICATIONS:    No current facility-administered medications for this encounter.     Current Outpatient Medications   Medication     [START ON 7/8/2023] predniSONE (DELTASONE) 10 MG tablet     acetaminophen-codeine (TYLENOL #3) 300-30 MG per tablet     betamethasone valerate 0.12 % FOAM     gabapentin (NEURONTIN) 100 MG capsule     HYDROcodone-acetaminophen (NORCO)  MG per tablet     triamcinolone (KENALOG) 0.1 % external ointment         ALLERGIES:  No Known Allergies    FAMILY HISTORY:  No family history on file.    SOCIAL HISTORY:  Social History     Socioeconomic History     Marital status:    Tobacco Use     Smoking status: Never     Smokeless tobacco: Never       VITALS:  Patient Vitals for the past 24 hrs:   BP Temp Temp src Pulse Resp SpO2 Weight   07/07/23 0705 -- -- -- 68 -- 99 % --   07/07/23 0700 -- -- -- 65 -- 98 % --   07/07/23 0532 123/77 97.1  F (36.2  C) Oral 80 16 96 % 80.7 kg (178 lb)       PHYSICAL EXAM    VITAL SIGNS: /77   Pulse 68   Temp 97.1  F (36.2  C) (Oral)   Resp 16   Wt 80.7 kg  (178 lb)   SpO2 99%   BMI 27.06 kg/m      General Appearance: Well-appearing, well-nourished, no acute distress   Head:  Normocephalic, without obvious abnormality, atraumatic  Eyes:  PERRL, conjunctiva/corneas clear, EOM's intact,  ENT:  Lips, mucosa, and tongue normal, membranes are moist without pallor  Neck:  Normal ROM, symmetrical, trachea midline    Cardio:  Regular rate and rhythm, no murmur, rub or gallop, 2+ pulses symmetric in all extremities  Pulm:  Clear to auscultation bilaterally, respirations unlabored,  Musculoskeletal: Full ROM, no edema, no cyanosis, good ROM of major joints  Integument:  Warm, Dry, No erythema. Urticaria on bilateral lower extremities. Mild swelling behind the left knee.   Neurologic:  Alert & oriented.  No focal deficits appreciated.  Ambulatory.  Psychiatric:  Affect normal, Judgment normal, Mood normal.      LABS  Results for orders placed or performed during the hospital encounter of 07/07/23 (from the past 24 hour(s))   US Lower Extremity Venous Duplex Left    Narrative    EXAM: US LOWER EXTREMITY VENOUS DUPLEX LEFT  LOCATION: [facility]  DATE: 7/7/2023    INDICATION: Swelling of the left leg, particularly behind left knee  COMPARISON: None.  TECHNIQUE: Venous Duplex ultrasound of the left lower extremity with and without compression, augmentation and duplex. Color flow and spectral Doppler with waveform analysis performed.    FINDINGS: Exam includes the common femoral, femoral, popliteal, and contralateral common femoral veins as well as segmentally visualized deep calf veins and greater saphenous vein.     LEFT: No deep vein thrombosis. No superficial thrombophlebitis. No popliteal cyst.      Impression    IMPRESSION:  1.  No deep venous thrombosis in the left lower extremity.         RADIOLOGY  US Lower Extremity Venous Duplex Left   Final Result   IMPRESSION:   1.  No deep venous thrombosis in the left lower extremity.             MEDICATIONS  GIVEN IN THE EMERGENCY:  Medications   diphenhydrAMINE (BENADRYL) injection 50 mg (50 mg Intravenous $Given 7/7/23 0644)   methylPREDNISolone sodium succinate (solu-MEDROL) injection 125 mg (125 mg Intravenous $Given 7/7/23 0644)       NEW PRESCRIPTIONS STARTED AT TODAY'S ER VISIT  New Prescriptions    PREDNISONE (DELTASONE) 10 MG TABLET    Take 5 tablets (50 mg) by mouth daily for 2 days, THEN 4 tablets (40 mg) daily for 2 days, THEN 3 tablets (30 mg) daily for 2 days, THEN 2 tablets (20 mg) daily for 2 days, THEN 1 tablet (10 mg) daily for 2 days.        I, Mikel Davis, am serving as a scribe to document services personally performed by Nestor Valdivia D.O., based on my observations and the provider's statements to me.  I, Nestor Valdivia D.O., attest that Mikel Davis is acting in a scribe capacity, has observed my performance of the services and has documented them in accordance with my direction.     Nestor Valdivia D.O.  Emergency Medicine  [facility] EMERGENCY ROOM  [address] [address]  [phone]  Dept: [phone]     Nestor Valdivia DO  07/07/23 0834

## 2023-07-30 ENCOUNTER — TRANSFERRED RECORDS (OUTPATIENT)
Dept: MULTI SPECIALTY CLINIC | Facility: CLINIC | Age: 58
End: 2023-07-30

## 2023-07-30 LAB — PAP SMEAR - HIM PATIENT REPORTED: NORMAL

## 2023-09-02 ENCOUNTER — HEALTH MAINTENANCE LETTER (OUTPATIENT)
Age: 58
End: 2023-09-02

## 2023-11-02 ENCOUNTER — HOSPITAL ENCOUNTER (OUTPATIENT)
Dept: MAMMOGRAPHY | Facility: CLINIC | Age: 58
Discharge: HOME | End: 2023-11-02
Admitting: RADIOLOGY
Payer: COMMERCIAL

## 2023-11-02 DIAGNOSIS — Z12.31 VISIT FOR SCREENING MAMMOGRAM: ICD-10-CM

## 2023-11-02 PROCEDURE — 77067 SCR MAMMO BI INCL CAD: CPT

## 2023-11-08 ENCOUNTER — IMMUNIZATION (OUTPATIENT)
Dept: FAMILY MEDICINE | Facility: CLINIC | Age: 58
End: 2023-11-08
Payer: COMMERCIAL

## 2023-11-08 PROCEDURE — 90686 IIV4 VACC NO PRSV 0.5 ML IM: CPT

## 2023-11-08 PROCEDURE — 91320 SARSCV2 VAC 30MCG TRS-SUC IM: CPT

## 2023-11-08 PROCEDURE — 90480 ADMN SARSCOV2 VAC 1/ONLY CMP: CPT

## 2023-11-08 PROCEDURE — 90471 IMMUNIZATION ADMIN: CPT

## 2024-02-14 ENCOUNTER — APPOINTMENT (OUTPATIENT)
Dept: URBAN - METROPOLITAN AREA CLINIC 260 | Age: 59
Setting detail: DERMATOLOGY
End: 2024-02-15

## 2024-02-14 VITALS — WEIGHT: 170 LBS | HEIGHT: 67 IN

## 2024-02-14 DIAGNOSIS — L20.89 OTHER ATOPIC DERMATITIS: ICD-10-CM

## 2024-02-14 PROCEDURE — OTHER PRESCRIPTION MEDICATION MANAGEMENT: OTHER

## 2024-02-14 PROCEDURE — OTHER ADDITIONAL NOTES: OTHER

## 2024-02-14 PROCEDURE — OTHER MIPS QUALITY: OTHER

## 2024-02-14 PROCEDURE — OTHER COUNSELING: OTHER

## 2024-02-14 PROCEDURE — 99204 OFFICE O/P NEW MOD 45 MIN: CPT

## 2024-02-14 PROCEDURE — OTHER PRESCRIPTION: OTHER

## 2024-02-14 RX ORDER — TACROLIMUS 1 MG/G
0.1% OINTMENT TOPICAL BID
Qty: 60 | Refills: 1 | Status: ERX | COMMUNITY
Start: 2024-02-14

## 2024-02-14 RX ORDER — TRIAMCINOLONE ACETONIDE 1 MG/G
0.1% CREAM TOPICAL
Qty: 453.6 | Refills: 0 | Status: ERX | COMMUNITY
Start: 2024-02-14

## 2024-02-14 ASSESSMENT — LOCATION DETAILED DESCRIPTION DERM
LOCATION DETAILED: INFERIOR THORACIC SPINE
LOCATION DETAILED: RIGHT ANTERIOR DISTAL THIGH
LOCATION DETAILED: LEFT ANTERIOR DISTAL THIGH
LOCATION DETAILED: RIGHT DISTAL PRETIBIAL REGION
LOCATION DETAILED: LEFT DISTAL PRETIBIAL REGION
LOCATION DETAILED: EPIGASTRIC SKIN

## 2024-02-14 ASSESSMENT — LOCATION SIMPLE DESCRIPTION DERM
LOCATION SIMPLE: RIGHT PRETIBIAL REGION
LOCATION SIMPLE: LEFT PRETIBIAL REGION
LOCATION SIMPLE: ABDOMEN
LOCATION SIMPLE: RIGHT THIGH
LOCATION SIMPLE: UPPER BACK
LOCATION SIMPLE: LEFT THIGH

## 2024-02-14 ASSESSMENT — BSA RASH: BSA RASH: 12

## 2024-02-14 ASSESSMENT — SEVERITY ASSESSMENT 2020: SEVERITY 2020: MODERATE

## 2024-02-14 ASSESSMENT — LOCATION ZONE DERM
LOCATION ZONE: LEG
LOCATION ZONE: TRUNK

## 2024-02-14 NOTE — HPI: ECZEMA (PATIENT REPORTED)
Where Is Your Eczema Located?: Legs
List Prescription Topical Steroids You Are Currently Using (Separate Each Name With A Comma):: Triamcinolone
Additional Comments (Use Complete Sentences): She is finding her atopic dermatitis is worse since moving to [place].  She also has co-occurring psoriasis but this is relatively quiet today. She has a persistent plaque on her posterior scalp.  Both diagnoses are biopsy proven and she has tried and failed many biologics. Her most effective therapy has been light therapy so far along with [facility] prn.

## 2024-02-14 NOTE — PROCEDURE: ADDITIONAL NOTES
Additional Notes: Briefly discussed pain in feet- not related to eczema or psoriasis and recommended to see primary care doctor.
Render Risk Assessment In Note?: no
Detail Level: Simple

## 2024-02-14 NOTE — PROCEDURE: COUNSELING
Detail Level: Generalized
Moisturizer Recommendations: Eucerin, Cetaphil, and CeraVe
Patient Specific Counseling (Will Not Stick From Patient To Patient): Can continue short bursts of sun exposure to assist.

## 2024-05-21 ENCOUNTER — RX ONLY (RX ONLY)
Age: 59
End: 2024-05-21

## 2024-05-21 RX ORDER — TACROLIMUS 1 MG/G
0.1% OINTMENT TOPICAL BID
Qty: 60 | Refills: 1 | Status: CANCELLED
Stop reason: CLARIF

## 2024-05-21 RX ORDER — TACROLIMUS 1 MG/G
0.1% OINTMENT TOPICAL BID
Qty: 60 | Refills: 0 | Status: ERX

## 2024-05-30 ENCOUNTER — OFFICE VISIT (OUTPATIENT)
Dept: FAMILY MEDICINE | Facility: CLINIC | Age: 59
End: 2024-05-30
Payer: COMMERCIAL

## 2024-05-30 VITALS
WEIGHT: 176 LBS | TEMPERATURE: 96.5 F | RESPIRATION RATE: 20 BRPM | HEART RATE: 78 BPM | DIASTOLIC BLOOD PRESSURE: 80 MMHG | BODY MASS INDEX: 26.67 KG/M2 | HEIGHT: 68 IN | OXYGEN SATURATION: 97 % | SYSTOLIC BLOOD PRESSURE: 128 MMHG

## 2024-05-30 DIAGNOSIS — F41.9 ANXIETY AND DEPRESSION: Primary | ICD-10-CM

## 2024-05-30 DIAGNOSIS — F32.A ANXIETY AND DEPRESSION: Primary | ICD-10-CM

## 2024-05-30 PROCEDURE — 99214 OFFICE O/P EST MOD 30 MIN: CPT | Performed by: FAMILY MEDICINE

## 2024-05-30 RX ORDER — HYDROXYZINE HYDROCHLORIDE 10 MG/1
TABLET, FILM COATED ORAL
Qty: 20 TABLET | Refills: 0 | Status: SHIPPED | OUTPATIENT
Start: 2024-05-30

## 2024-05-30 RX ORDER — ESCITALOPRAM OXALATE 10 MG/1
10 TABLET ORAL DAILY
Qty: 60 TABLET | Refills: 1 | Status: SHIPPED | OUTPATIENT
Start: 2024-05-30

## 2024-05-30 RX ORDER — SEMAGLUTIDE 1.34 MG/ML
0.75 INJECTION, SOLUTION SUBCUTANEOUS
COMMUNITY

## 2024-05-30 RX ORDER — ALBUTEROL SULFATE 90 UG/1
2 AEROSOL, METERED RESPIRATORY (INHALATION) EVERY 6 HOURS PRN
COMMUNITY
Start: 2024-05-23 | End: 2024-08-14

## 2024-05-30 ASSESSMENT — PATIENT HEALTH QUESTIONNAIRE - PHQ9
SUM OF ALL RESPONSES TO PHQ QUESTIONS 1-9: 15
SUM OF ALL RESPONSES TO PHQ QUESTIONS 1-9: 15
10. IF YOU CHECKED OFF ANY PROBLEMS, HOW DIFFICULT HAVE THESE PROBLEMS MADE IT FOR YOU TO DO YOUR WORK, TAKE CARE OF THINGS AT HOME, OR GET ALONG WITH OTHER PEOPLE: VERY DIFFICULT

## 2024-05-30 ASSESSMENT — PAIN SCALES - GENERAL: PAINLEVEL: NO PAIN (0)

## 2024-05-30 NOTE — PROGRESS NOTES
"  Assessment & Plan     Anxiety and depression  Patient is having difficulty with her nerves and conversion reactions and anxiety depression taking care of her mother organ to begin an antidepressant and give her a antianxiety agent so that she can fly she also needs to establish with a psychologist  - escitalopram (LEXAPRO) 10 MG tablet; Take 1 tablet (10 mg) by mouth daily  - hydrOXYzine HCl (ATARAX) 10 MG tablet; One every six hours for anxiety  - Adult Mental Health  Referral; Future          BMI  Estimated body mass index is 26.76 kg/m  as calculated from the following:    Height as of this encounter: 1.727 m (5' 8\").    Weight as of this encounter: 79.8 kg (176 lb).       Depression Screening Follow Up        Follow Up Actions Taken             Subjective   Iman is a 58 year old, presenting for the following health issues:  Establish Care, Cough, and Anxiety      Via the Health Maintenance questionnaire, the patient has reported the following services have been completed -Cervical Cancer Screening: dr office 2023, this information has been sent to the abstraction team.  HPI this is a first visit here at [facility] for this patient who recently moved back from [place] to take care of her mother; her father  17 months ago from a long illness.  She is quite concerned about her mother does not feel she will live much longer she feels guilty about that.  She is a middle child she does have her sister here who also helps take care of her mother the patient currently is living with her mother.  Her brother lives in [place].  Patient is a active employee of the [employer] and has a job which is very important she travels on a weekly basis back and forth between [place] and [facility].  She is concerned she is at a upper respiratory illness and a cough for a couple of months which persists she is having some right upper quadrant pain she has had some headaches she is not " "sleeping very well.  Her weight has been stable.  She is concerned about her health I examined her physical exam was unremarkable she had complete physical a year ago all normal parameters she will get those records for us.  Patient needs anxiety depression counseling and medication in my opinion I will start her on Lexapro 10 mg I will give her some hydroxyzine 10 mg to take before airplane flights sometimes she gets acute anxiety.  I will arrange for psychology psychiatry to see her and evaluate her I will be willing to see her for follow-up in 1 month and then we will get her established with a permanent primary care physician here time spent face-to-face non-face-to-face today for new patient 45 minutes              Review of Systems  CONSTITUTIONAL: NEGATIVE for fever, chills, change in weight  INTEGUMENTARY/SKIN: NEGATIVE for worrisome rashes, moles or lesions  EYES: NEGATIVE for vision changes or irritation  ENT/MOUTH: NEGATIVE for ear, mouth and throat problems  RESP: NEGATIVE for significant cough or SOB  BREAST: NEGATIVE for masses, tenderness or discharge  CV: NEGATIVE for chest pain, palpitations or peripheral edema  GI: NEGATIVE for nausea, abdominal pain, heartburn, or change in bowel habits  : NEGATIVE for frequency, dysuria, or hematuria  MUSCULOSKELETAL: NEGATIVE for significant arthralgias or myalgia  NEURO: NEGATIVE for weakness, dizziness or paresthesias  ENDOCRINE: NEGATIVE for temperature intolerance, skin/hair changes  HEME: NEGATIVE for bleeding problems  PSYCHIATRIC: NEGATIVE for changes in mood or affect      Objective    /80   Pulse 78   Temp (!) 96.5  F (35.8  C) (Temporal)   Resp 20   Ht 1.727 m (5' 8\")   Wt 79.8 kg (176 lb)   SpO2 97%   BMI 26.76 kg/m    Body mass index is 26.76 kg/m .  Physical Exam       General Appearance:  Alert, cooperative, no distress  Head:  Normocephalic, no obvious abnormality  Ears: TM anatomy normal  Eyes:  PERRL, EOM's intact, conjunctiva and " corneas clear  Nose:  Nares symmetrical, septum midline, mucosa pink, no sinus tenderness  Throat:  Lips, tongue, and mucosa are moist, pink, and intact  Neck:  Supple, symmetrical, trachea midline, no adenopathy; thyroid: no enlargement, symmetric,no tenderness/mass/nodules; no carotid bruit, no JVD  Back:  Symmetrical, no curvature, ROM normal, no CVA tenderness  Chest/Breast:  No mass or tenderness  Lungs:  Clear to auscultation bilaterally, respirations unlabored   Heart:  Normal PMI, regular rate & rhythm, S1 and S2 normal, no murmurs, rubs, or gallops  Abdomen:  Soft, non-tender, bowel sounds active all four quadrants, no mass, or organomegaly  Musculoskeletal:  Tone and strength strong and symmetrical, all extremities  Lymphatic:  No adenopathy  Skin/Hair/Nails:  Skin warm, dry, and intact, no rashes  Neurologic:  Alert and oriented x3, no cranial nerve deficits, normal strength and tone, gait steady  Extremities:  No edema.  Moose's sign negative.    Genitourinary: deferred  Pulses:  Equal bilaterally          Signed Electronically by: Nestor Garcia MD

## 2024-06-18 ASSESSMENT — PATIENT HEALTH QUESTIONNAIRE - PHQ9
SUM OF ALL RESPONSES TO PHQ QUESTIONS 1-9: 11
10. IF YOU CHECKED OFF ANY PROBLEMS, HOW DIFFICULT HAVE THESE PROBLEMS MADE IT FOR YOU TO DO YOUR WORK, TAKE CARE OF THINGS AT HOME, OR GET ALONG WITH OTHER PEOPLE: SOMEWHAT DIFFICULT
SUM OF ALL RESPONSES TO PHQ QUESTIONS 1-9: 11

## 2024-06-19 ENCOUNTER — VIRTUAL VISIT (OUTPATIENT)
Dept: BEHAVIORAL HEALTH | Facility: CLINIC | Age: 59
End: 2024-06-19
Attending: FAMILY MEDICINE
Payer: COMMERCIAL

## 2024-06-19 DIAGNOSIS — F41.9 ANXIETY AND DEPRESSION: ICD-10-CM

## 2024-06-19 DIAGNOSIS — F32.A ANXIETY AND DEPRESSION: ICD-10-CM

## 2024-06-19 PROCEDURE — 90834 PSYTX W PT 45 MINUTES: CPT | Mod: 95 | Performed by: SOCIAL WORKER

## 2024-06-19 ASSESSMENT — ANXIETY QUESTIONNAIRES
7. FEELING AFRAID AS IF SOMETHING AWFUL MIGHT HAPPEN: SEVERAL DAYS
8. IF YOU CHECKED OFF ANY PROBLEMS, HOW DIFFICULT HAVE THESE MADE IT FOR YOU TO DO YOUR WORK, TAKE CARE OF THINGS AT HOME, OR GET ALONG WITH OTHER PEOPLE?: SOMEWHAT DIFFICULT
GAD7 TOTAL SCORE: 5
GAD7 TOTAL SCORE: 5
4. TROUBLE RELAXING: SEVERAL DAYS
3. WORRYING TOO MUCH ABOUT DIFFERENT THINGS: SEVERAL DAYS
5. BEING SO RESTLESS THAT IT IS HARD TO SIT STILL: NOT AT ALL
1. FEELING NERVOUS, ANXIOUS, OR ON EDGE: SEVERAL DAYS
GAD7 TOTAL SCORE: 5
2. NOT BEING ABLE TO STOP OR CONTROL WORRYING: SEVERAL DAYS
IF YOU CHECKED OFF ANY PROBLEMS ON THIS QUESTIONNAIRE, HOW DIFFICULT HAVE THESE PROBLEMS MADE IT FOR YOU TO DO YOUR WORK, TAKE CARE OF THINGS AT HOME, OR GET ALONG WITH OTHER PEOPLE: SOMEWHAT DIFFICULT
6. BECOMING EASILY ANNOYED OR IRRITABLE: NOT AT ALL
7. FEELING AFRAID AS IF SOMETHING AWFUL MIGHT HAPPEN: SEVERAL DAYS

## 2024-06-19 NOTE — PROGRESS NOTES
[facility] Primary Care: Integrated Behavioral Health  2024      Behavioral Health Clinician Progress Note    Patient Name: Iman Sánchez           Service Type:  Individual      Service Location:   MyChart / Email (patient reached)     Session Start Time: 12:56p  Session End Time: 1:45p      Session Length: 38 - 52      Attendees: Client     Service Modality:  Video Visit:      Provider verified identity through the following two step process.  Patient provided:  Patient  and Patient address    Telemedicine Visit: The patient's condition can be safely assessed and treated via synchronous audio and visual telemedicine encounter.      Reason for Telemedicine Visit: Patient has requested telehealth visit    Originating Site (Patient Location): Patient's home    Distant Site (Provider Location): [facility]    Consent:  The patient/guardian has verbally consented to: the potential risks and benefits of telemedicine (video visit) versus in person care; bill my insurance or make self-payment for services provided; and responsibility for payment of non-covered services.     Patient would like the video invitation sent by:  My Chart    Mode of Communication:  Video Conference via [facility]    Distant Location (Provider):  On-site    As the provider I attest to compliance with applicable laws and regulations related to telemedicine.    Visit Activities (Refresh list every visit): [facility] Only    Diagnostic Assessment Date: to be completed within first 3 [facility] visits  Treatment Plan Review Date: to be completed after DA  See Flowsheets for today's PHQ-9 and AKUA-7 results  Previous PHQ-9:       2024     9:18 AM 2024     2:04 PM   PHQ-9 SCORE   PHQ-9 Total Score Alondra 15 (Moderately severe depression) 11 (Moderate depression)   PHQ-9 Total Score 15 11     Previous AKUA-7:        No data to display                DATA  Extended Session (60+ minutes):  No  Interactive Complexity: No  Crisis: No  [facility] Patient: No    Treatment Objective(s) Addressed in This Session:  Target Behavior(s): disease management/lifestyle changes anxiety    Anxiety: will experience a reduction in anxiety, will develop more effective coping skills to manage anxiety symptoms, and will develop healthy cognitive patterns and beliefs    Current Stressors / Issues:  This is pt's first visit with [facility].     Pt moved back home to [place] to be with her aging parents a few years ago.  Dad passed 17 months after moved home and now is living with mother and helping mother with her needs.  Pt reports having  good relationship with her mother and enjoys that they live together.  Has 2 kittens that she says are very therapeutic. Continues to work at the same job she had when in [place] and is remote most of the time though every other month or so has to fly back to [place] for a couple of days.  Pt shares that she lacks motivation to do much and finds gets very anxious when has to fly to [place].  Finds she is anxious about leaving mother, flying, traveling in [place], etc.  The couple of days before a flight to [place] are really hard on her.    Pt does wonder if menopause is a source of a lot of her anxiety. Feels that used to get a lot more done.  10 years ago worked multiple jobs and was very active.  Did try hormonal balance medication and did not find helpful.  [facility] and pt did discuss relaxation tools and challenging anxious thoughts.        Progress on Treatment Objective(s) / Homework:  Initial visit    Motivational Interviewing    MI Intervention: Expressed Empathy/Understanding, Supported Autonomy, Collaboration, Evocation, Permission to raise concern or advise, Open-ended questions, and Reflections: simple and complex     Change Talk Expressed by the Patient: Desire to change    Provider Response to Change Talk: E - Evoked more info from patient about behavior change, A - Affirmed patient's thoughts, decisions, or attempts at  behavior change, R - Reflected patient's change talk, and S - Summarized patient's change talk statements    Also provided psychoeducation about behavioral health condition, symptoms, and treatment options    Cognitive Behavioral Therapy- Challenging anxious thoughts, relaxation, mindfulness    Assessments completed prior to visit:  The following assessments were completed by patient for this visit:  PHQ9:       5/30/2024     9:18 AM 6/18/2024     2:04 PM   PHQ-9 SCORE   PHQ-9 Total Score MyChart 15 (Moderately severe depression) 11 (Moderate depression)   PHQ-9 Total Score 15 11     GAD2:       6/19/2024    12:39 PM   AKUA-2   Feeling nervous, anxious, or on edge 1   Not being able to stop or control worrying 1   AKUA-2 Total Score 2     GAD7:        No data to display              PROMIS 10-Global Health (all questions and answers displayed):       6/19/2024    12:40 PM   PROMIS 10   In general, would you say your health is: Fair   In general, would you say your quality of life is: Good   In general, how would you rate your physical health? Fair   In general, how would you rate your mental health, including your mood and your ability to think? Good   In general, how would you rate your satisfaction with your social activities and relationships? Good   In general, please rate how well you carry out your usual social activities and roles Good   To what extent are you able to carry out your everyday physical activities such as walking, climbing stairs, carrying groceries, or moving a chair? Mostly   In the past 7 days, how often have you been bothered by emotional problems such as feeling anxious, depressed, or irritable? Sometimes   In the past 7 days, how would you rate your fatigue on average? Moderate   In the past 7 days, how would you rate your pain on average, where 0 means no pain, and 10 means worst imaginable pain? 2   In general, would you say your health is: 2   In general, would you say your quality of  life is: 3   In general, how would you rate your physical health? 2   In general, how would you rate your mental health, including your mood and your ability to think? 3   In general, how would you rate your satisfaction with your social activities and relationships? 3   In general, please rate how well you carry out your usual social activities and roles. (This includes activities at home, at work and in your community, and responsibilities as a parent, child, spouse, employee, friend, etc.) 3   To what extent are you able to carry out your everyday physical activities such as walking, climbing stairs, carrying groceries, or moving a chair? 4   In the past 7 days, how often have you been bothered by emotional problems such as feeling anxious, depressed, or irritable? 3   In the past 7 days, how would you rate your fatigue on average? 3   In the past 7 days, how would you rate your pain on average, where 0 means no pain, and 10 means worst imaginable pain? 2   Global Mental Health Score 12   Global Physical Health Score 13   PROMIS TOTAL - SUBSCORES 25     PROMIS 10-Global Health (only subscores and total score):       6/19/2024    12:40 PM   PROMIS-10 Scores Only   Global Mental Health Score 12   Global Physical Health Score 13   PROMIS TOTAL - SUBSCORES 25     Harris Suicide Severity Rating Scale (Lifetime/Recent)       No data to display                Care Plan review completed: No    Medication Review:  Changes to psychiatric medications, see updated Medication List in EPIC.   Recently started lexapro and hydroxyzine    Medication Compliance:  Yes    Changes in Health Issues:   None reported    Chemical Use Review:   Substance Use: Chemical use reviewed, no active concerns identified      Tobacco Use: No current tobacco use.      Assessment: Current Emotional / Mental Status (status of significant symptoms):  Risk status (Self / Other harm or suicidal ideation)  Patient denies a history of suicidal ideation,  suicide attempts, self-injurious behavior, homicidal ideation, homicidal behavior, and and other safety concerns  Patient denies current fears or concerns for personal safety.  Patient denies current or recent suicidal ideation or behaviors.  Patient denies current or recent homicidal ideation or behaviors.  Patient denies current or recent self injurious behavior or ideation.  Patient denies other safety concerns.  A safety and risk management plan has not been developed at this time, however patient was encouraged to call [place] [phone] should there be a change in any of these risk factors.    Appearance:   Appropriate   Eye Contact:   Good   Psychomotor Behavior: Normal   Attitude:   Cooperative   Orientation:   All  Speech   Rate / Production: Normal    Volume:  Normal   Mood:    Normal  Affect:    Appropriate   Thought Content:  Clear   Thought Form:  Coherent  Logical   Insight:    Good     Diagnoses:  1. Anxiety and depression        Collateral Reports Completed:  Not Applicable    Plan: (Homework, other):  Patient was given information about behavioral services and encouraged to schedule a follow up appointment with the clinic [facility] in 2 weeks.  She was also given information about mental health symptoms and treatment options .  CD Recommendations: No indications of CD issues.         BRAD Nowak, LICSW, [facility]  June 19, 2024

## 2024-06-20 ENCOUNTER — OFFICE VISIT (OUTPATIENT)
Dept: FAMILY MEDICINE | Facility: CLINIC | Age: 59
End: 2024-06-20
Payer: COMMERCIAL

## 2024-06-20 VITALS
RESPIRATION RATE: 18 BRPM | WEIGHT: 176.1 LBS | HEART RATE: 80 BPM | OXYGEN SATURATION: 98 % | HEIGHT: 68 IN | TEMPERATURE: 98 F | SYSTOLIC BLOOD PRESSURE: 120 MMHG | BODY MASS INDEX: 26.69 KG/M2 | DIASTOLIC BLOOD PRESSURE: 72 MMHG

## 2024-06-20 DIAGNOSIS — F32.A ANXIETY AND DEPRESSION: Primary | ICD-10-CM

## 2024-06-20 DIAGNOSIS — F41.9 ANXIETY AND DEPRESSION: Primary | ICD-10-CM

## 2024-06-20 PROCEDURE — 99214 OFFICE O/P EST MOD 30 MIN: CPT | Performed by: FAMILY MEDICINE

## 2024-06-20 ASSESSMENT — PAIN SCALES - GENERAL: PAINLEVEL: NO PAIN (0)

## 2024-06-20 NOTE — PROGRESS NOTES
Assessment & Plan     Anxiety and depression  Continue Lexapro at 10 mg/day follow-up with consultant for further recommendations; she will require Pap smear physical exam 1 month  - Adult Mental Health  Referral; Future                Subjective   Iman is a 58 year old, presenting for the following health issues:  No chief complaint on file.    HPI she presents here for follow-up after seeing psychology as a referral for anxiety and depression.  I started her on Lexapro 10 mg which has helped her immensely.  Her main complaint is that she is a little bit sluggish in the morning and would benefit from ongoing counseling and a new referral.  I have placed a new referral for psychiatry/medication management which ultimately will be able to be handled here in primary care.  She is requesting annual physical exam to include Pap smear with the provider who will be here in the far future and I recommended Dr. Key and arrange an appointment for her in a month or so.  I am very pleased with the response that she has gotten from Lexapro we will hold the dosage at 10 mg and defer to her colleagues and mental health to see if they recommend an increase in medication or addition of some other agent.              Review of Systems  CONSTITUTIONAL: NEGATIVE for fever, chills, change in weight  INTEGUMENTARY/SKIN: NEGATIVE for worrisome rashes, moles or lesions  EYES: NEGATIVE for vision changes or irritation  ENT/MOUTH: NEGATIVE for ear, mouth and throat problems  RESP: NEGATIVE for significant cough or SOB  BREAST: NEGATIVE for masses, tenderness or discharge  CV: NEGATIVE for chest pain, palpitations or peripheral edema  GI: NEGATIVE for nausea, abdominal pain, heartburn, or change in bowel habits  : NEGATIVE for frequency, dysuria, or hematuria  MUSCULOSKELETAL: NEGATIVE for significant arthralgias or myalgia  NEURO: NEGATIVE for weakness, dizziness or paresthesias  ENDOCRINE: NEGATIVE for temperature  "intolerance, skin/hair changes  HEME: NEGATIVE for bleeding problems  PSYCHIATRIC: NEGATIVE for changes in mood or affect      Objective    /72   Pulse 80   Temp 98  F (36.7  C)   Resp 18   Ht 1.727 m (5' 8\")   Wt 79.9 kg (176 lb 1.6 oz)   SpO2 98%   BMI 26.78 kg/m    Body mass index is 26.78 kg/m .  Physical Exam   GENERAL: alert and no distress  NECK: no adenopathy, no asymmetry, masses, or scars  RESP: lungs clear to auscultation - no rales, rhonchi or wheezes  CV: regular rate and rhythm, normal S1 S2, no S3 or S4, no murmur, click or rub, no peripheral edema  ABDOMEN: soft, nontender, no hepatosplenomegaly, no masses and bowel sounds normal  MS: no gross musculoskeletal defects noted, no edema            Signed Electronically by: Nestor Garcia MD    "

## 2024-08-12 NOTE — PROGRESS NOTES
Assessment/Plan:   Iman is a 59 year old female here for physical with additional concerns    Routine adult health maintenance  Physical completed today.  Labs as below.  Vaccine as below.  Up-to-date with mammogram.  JAEL signed today to obtain records from last Pap smear and colonoscopy.  - Basic metabolic panel  (Ca, Cl, CO2, Creat, Gluc, K, Na, BUN)  - CBC with platelets  - Vitamin D deficiency screening    Establishing care with new doctor, encounter for  Establishing care today, past medical history reviewed and updated in EMR.    Lipid screening  Given age, will check lipid panel today.  - Lipid panel reflex to direct LDL Non-fasting    Diabetes mellitus screening  Given age, screen for diabetes today.  - Hemoglobin A1c    Screening for HIV (human immunodeficiency virus)  Due for one-time HIV screen.  - HIV Antigen Antibody Combo    Need for hepatitis C screening test  Due for one-time hepatitis C screen.  - Hepatitis C Screen Reflex to HCV RNA Quant and Genotype    Hepatitis B vaccination status unknown  Hepatitis B vaccine status unknown, check titer today with rest of labs.  - Hepatitis B Surface Antibody    Eczema, unspecified type  Patient reports history of significant eczema, refills provided today, referral placed to dermatology today for management.  - Adult Dermatology  Referral  - betamethasone valerate 0.12 % FOAM  Dispense: 100 g; Refill: 1  - triamcinolone (KENALOG) 0.1 % external ointment  Dispense: 30 g; Refill: 1    Tooth hypersensitivity  Patient reports using PreviDent dental paste, wondering about refill, has upcoming appointment with dentist.  - sodium fluoride (PREVIDENT 5000 BOOSTER PLUS) 1.1 % PSTE dental paste  Dispense: 100 mL; Refill: 3    Hair loss  Patient ports issues with hair loss, check labs as below.  - TSH with free T4 reflex  - Iron and iron binding capacity  - Ferritin    Overactive bladder  Patient describes overactive bladder, check UA today, if normal would  recommend trial of medication.  - UA Macroscopic with reflex to Microscopic and Culture - Lab Collect    Itching  Patient reports some issues with intermittent itching, will check LFTs today.  - Hepatic panel (Albumin, ALT, AST, Bili, Alk Phos, TP)    Screening mammogram for breast cancer  Due for mammogram in November, order placed today.  - MA Screen Bilateral w/Wilver       I have had an Advance Directives discussion with the patient.    Follow up: 1 year for physical, sooner as needed    Collette Chou MD  [facility]      Subjective:     Iman Sánchez is a 59 year old female who presents for an annual exam.     Question 8/14/2024  8:46 AM CDT - Filed by Patient   In general, how would you rate your overall physical health? Good   Do you get at least 3 servings of foods that have calcium each day (dairy, green leafy vegetables, etc)? (!) NO   Do you have a special diet? Low fat/cholesterol    Carbohydrate counting    Breakfast skipped    Other   If other, please elaborate: Semiglutide   How many servings of fruits and vegetables do you eat daily? (!) 2-3   On average, how many sweetened beverages do you drink each day (Examples: soda, juice, sweet tea, etc.)? Do NOT count diet or artificially sweetened beverages. 0-1   On average, how many days per week do you engage in moderate to strenuous exercise (like a brisk walk)? 3 days   On average, how many minutes do you engage in exercise at this level? 20 min   How often do you get together with friends or relatives? Once a week   Have you fallen 2 or more times in the past year? No   Trouble with walking or balance? No   Do you see a dentist two times every year? Yes   Do you feel stress - tense, restless, nervous, or anxious, or unable to sleep at night because your mind is troubled all the time - these days? Very much   If you drink alcohol, do you typically have more than 3 drinks per day OR more than 7 drinks per week? No   Do you use any  substances not prescribed by a provider, out of habit or for other reasons? No   Have you ever been tested for HIV (at a blood donation center, clinic, or during routine prenatal care)? No   Have you had any new sexual partners since you were last tested for sexually transmitted infections, including HIV? No   Within the past 12 months, did you worry that your food would run out before you got money to buy more? No   Within the past 12 months, did the food you bought just not last and you didn t have money to get more? No   Do you have housing? (Housing is defined as stable permanent housing and does not include staying ouside in a car, in a tent, in an abandoned building, in an overnight shelter, or couch-surfing.) Yes   Are you worried about losing your housing? No   Within the past 12 months, has lack of transportation kept you from medical appointments, getting your medicines, non-medical meetings or appointments, work, or from getting things that you need? No   Within the past 12 months, have you or your family members you live with been unable to get utilities (heat, electricity) when it was really needed? No   Were you born outside of the [place]? No     2015 dx with eczema - her notes below   Severe flare ups that could only be settled in the past with medical grade red light treatment 3 x/week at intervals of 20 seconds. Had horrible reaction to injections and other treatments because I was misdiagnosed with psoriasis.   Having more flares in past 6 months    Having issues with hair loss  Describes overactive bladder issue  On ozempic for weight gain   Some anxiety more recently - on lexapro 10mg, hydroxyzine 10mg (thinking about trying 1/2 tablet)    Health Maintenance reviewed:  Lipid Profile: due  Glucose Screen: due  Colonoscopy: JAEL signed today  Mammogram: due in Nov    Gynecologic History  No LMP recorded. Patient is postmenopausal.  Last pap in [place] July 2023 - no hx abnormal - JAEL signed  today    Immunization History   Administered Date(s) Administered    COVID-19 12+ (2023-24) (Pfizer) 11/08/2023    Influenza Vaccine >6 months,quad, PF 11/08/2023    Zoster recombinant adjuvanted (SHINGRIX) 04/01/2024     Immunization status: up to date and documented.    Current Outpatient Medications   Medication Sig Dispense Refill    betamethasone valerate 0.12 % FOAM Apply daily for 7 days and then off for 7 days - apply to scalp for eczema. 100 g 1    escitalopram (LEXAPRO) 10 MG tablet Take 1 tablet (10 mg) by mouth daily 60 tablet 1    hydrOXYzine HCl (ATARAX) 10 MG tablet One every six hours for anxiety 20 tablet 0    semaglutide (OZEMPIC, 0.25 OR 0.5 MG/DOSE,) 2 MG/1.5ML SOPN pen Inject 0.75 mg Subcutaneous every 7 days      sodium fluoride (PREVIDENT 5000 BOOSTER PLUS) 1.1 % PSTE dental paste Apply 2 mLs to affected area daily 100 mL 3    triamcinolone (KENALOG) 0.1 % external ointment Apply topically 2 times daily 30 g 1     Past Medical History:   Diagnosis Date    Arthritis 2015    Depressive disorder 2020     Past Surgical History:   Procedure Laterality Date    COSMETIC SURGERY      facelift and liposuction     Patient has no known allergies.  Family History   Problem Relation Age of Onset    Cerebrovascular Disease Mother     Asthma Mother         COPD    Obesity Mother         Struggled with weight during adulthood nut not severe    Osteoarthritis Father         severe    Obesity Father         Struggled with weight towards end of life but not severe    Diverticulitis Father     Other - See Comments Father         back surgeries    No Known Problems Sister     No Known Problems Brother     Stomach Cancer Maternal Grandmother     Asthma Maternal Grandfather     Rheumatoid Arthritis Paternal Grandmother     Coronary Artery Disease Paternal Grandfather     Breast Cancer Cousin         early 40s    Brain Cancer Maternal Uncle         40s     Social History     Socioeconomic History    Marital status:       Spouse name: Not on file    Number of children: Not on file    Years of education: Not on file    Highest education level: Not on file   Occupational History    Not on file   Tobacco Use    Smoking status: Former     Current packs/day: 0.50     Average packs/day: 0.5 packs/day for 3.0 years (1.5 ttl pk-yrs)     Types: Cigarettes    Smokeless tobacco: Never   Vaping Use    Vaping status: Never Used   Substance and Sexual Activity    Alcohol use: Yes     Comment: Once a week- light drinker    Drug use: Not Currently     Types: Marijuana     Comment: Not since 2019    Sexual activity: Not Currently     Partners: Male     Birth control/protection: Condom   Other Topics Concern    Parent/sibling w/ CABG, MI or angioplasty before 65F 55M? No   Social History Narrative    Not on file     Social Determinants of Health     Financial Resource Strain: Low Risk  (8/14/2024)    Financial Resource Strain     Within the past 12 months, have you or your family members you live with been unable to get utilities (heat, electricity) when it was really needed?: No   Food Insecurity: Low Risk  (8/14/2024)    Food Insecurity     Within the past 12 months, did you worry that your food would run out before you got money to buy more?: No     Within the past 12 months, did the food you bought just not last and you didn t have money to get more?: No   Transportation Needs: Low Risk  (8/14/2024)    Transportation Needs     Within the past 12 months, has lack of transportation kept you from medical appointments, getting your medicines, non-medical meetings or appointments, work, or from getting things that you need?: No   Physical Activity: Insufficiently Active (8/14/2024)    Exercise Vital Sign     Days of Exercise per Week: 3 days     Minutes of Exercise per Session: 20 min   Stress: Stress Concern Present (8/14/2024)    [employer] - Occupational Stress Questionnaire     Feeling of Stress : Very  "much   Social Connections: Unknown (8/14/2024)    Social Connection and Isolation Panel [NHANES]     Frequency of Communication with Friends and Family: Not on file     Frequency of Social Gatherings with Friends and Family: Once a week     Attends Hindu Services: Not on file     Active Member of Clubs or Organizations: Not on file     Attends Club or Organization Meetings: Not on file     Marital Status: Not on file   Interpersonal Safety: Low Risk  (8/14/2024)    Interpersonal Safety     Do you feel physically and emotionally safe where you currently live?: Yes     Within the past 12 months, have you been hit, slapped, kicked or otherwise physically hurt by someone?: No     Within the past 12 months, have you been humiliated or emotionally abused in other ways by your partner or ex-partner?: No   Housing Stability: Low Risk  (8/14/2024)    Housing Stability     Do you have housing? : Yes     Are you worried about losing your housing?: No       Review of Systems negative unless noted    Objective:        Vitals:    08/14/24 1339   BP: 110/70   Pulse: 79   Resp: 18   Temp: 97.4  F (36.3  C)   TempSrc: Temporal   SpO2: 97%   Weight: 79.3 kg (174 lb 14.4 oz)   Height: 1.727 m (5' 8\")   PainSc: No Pain (0)     Body mass index is 26.59 kg/m .    Physical Exam:  General Appearance: Alert, pleasant, appears stated age  Head: Normocephalic, without obvious abnormality  Eyes: PERRL, conjunctiva/corneas clear, EOM's intact  Ears: Normal TM's and external ear canals, both ears  Nose: Nares normal, septum midline,mucosa normal, no drainage  Throat: Lips, mucosa, and tongue normal; teeth and gums normal; oropharynx is clear  Neck: Supple,without lymphadenopathy, no thyromegaly or nodules noted  Lungs: Clear to auscultation bilaterally, respirations unlabored, no wheezing or crackles  Heart: Regular rate and rhythm, no murmur   Abdomen: Soft, non-tender, no masses, no organomegaly  Extremities: Extremities with strong and " symmetric pulses, no cyanosis or edema  Skin: Skin color, texture normal, no rashes or lesions  Neurologic: Grossly normal, no focal deficits

## 2024-08-14 ENCOUNTER — OFFICE VISIT (OUTPATIENT)
Dept: FAMILY MEDICINE | Facility: CLINIC | Age: 59
End: 2024-08-14
Payer: COMMERCIAL

## 2024-08-14 VITALS
OXYGEN SATURATION: 97 % | SYSTOLIC BLOOD PRESSURE: 110 MMHG | HEIGHT: 68 IN | HEART RATE: 79 BPM | WEIGHT: 174.9 LBS | RESPIRATION RATE: 18 BRPM | DIASTOLIC BLOOD PRESSURE: 70 MMHG | TEMPERATURE: 97.4 F | BODY MASS INDEX: 26.51 KG/M2

## 2024-08-14 DIAGNOSIS — Z00.00 ROUTINE ADULT HEALTH MAINTENANCE: Primary | ICD-10-CM

## 2024-08-14 DIAGNOSIS — N32.81 OVERACTIVE BLADDER: ICD-10-CM

## 2024-08-14 DIAGNOSIS — L65.9 HAIR LOSS: ICD-10-CM

## 2024-08-14 DIAGNOSIS — K03.89: ICD-10-CM

## 2024-08-14 DIAGNOSIS — Z76.89 ESTABLISHING CARE WITH NEW DOCTOR, ENCOUNTER FOR: ICD-10-CM

## 2024-08-14 DIAGNOSIS — Z78.9 HEPATITIS B VACCINATION STATUS UNKNOWN: ICD-10-CM

## 2024-08-14 DIAGNOSIS — Z11.59 NEED FOR HEPATITIS C SCREENING TEST: ICD-10-CM

## 2024-08-14 DIAGNOSIS — Z13.220 LIPID SCREENING: ICD-10-CM

## 2024-08-14 DIAGNOSIS — Z11.4 SCREENING FOR HIV (HUMAN IMMUNODEFICIENCY VIRUS): ICD-10-CM

## 2024-08-14 DIAGNOSIS — Z12.31 SCREENING MAMMOGRAM FOR BREAST CANCER: ICD-10-CM

## 2024-08-14 DIAGNOSIS — L30.9 ECZEMA, UNSPECIFIED TYPE: ICD-10-CM

## 2024-08-14 DIAGNOSIS — Z23 ENCOUNTER FOR VACCINATION: ICD-10-CM

## 2024-08-14 DIAGNOSIS — Z13.1 DIABETES MELLITUS SCREENING: ICD-10-CM

## 2024-08-14 DIAGNOSIS — L29.9 ITCHING: ICD-10-CM

## 2024-08-14 PROBLEM — M79.673 FOOT PAIN: Status: ACTIVE | Noted: 2022-12-01

## 2024-08-14 PROBLEM — A69.20 LYME DISEASE: Status: ACTIVE | Noted: 2019-05-01

## 2024-08-14 LAB
ALBUMIN SERPL BCG-MCNC: 4.3 G/DL (ref 3.5–5.2)
ALBUMIN UR-MCNC: NEGATIVE MG/DL
ALP SERPL-CCNC: 117 U/L (ref 40–150)
ALT SERPL W P-5'-P-CCNC: 13 U/L (ref 0–50)
ANION GAP SERPL CALCULATED.3IONS-SCNC: 11 MMOL/L (ref 7–15)
APPEARANCE UR: CLEAR
AST SERPL W P-5'-P-CCNC: 25 U/L (ref 0–45)
BILIRUB DIRECT SERPL-MCNC: <0.2 MG/DL (ref 0–0.3)
BILIRUB SERPL-MCNC: 0.3 MG/DL
BILIRUB UR QL STRIP: NEGATIVE
BUN SERPL-MCNC: 20.5 MG/DL (ref 6–20)
CALCIUM SERPL-MCNC: 9.4 MG/DL (ref 8.8–10.4)
CHLORIDE SERPL-SCNC: 102 MMOL/L (ref 98–107)
CHOLEST SERPL-MCNC: 187 MG/DL
COLOR UR AUTO: YELLOW
CREAT SERPL-MCNC: 1.39 MG/DL (ref 0.51–0.95)
EGFRCR SERPLBLD CKD-EPI 2021: 44 ML/MIN/1.73M2
ERYTHROCYTE [DISTWIDTH] IN BLOOD BY AUTOMATED COUNT: 13.2 % (ref 10–15)
FASTING STATUS PATIENT QL REPORTED: ABNORMAL
FASTING STATUS PATIENT QL REPORTED: ABNORMAL
FERRITIN SERPL-MCNC: 96 NG/ML (ref 11–328)
GLUCOSE SERPL-MCNC: 86 MG/DL (ref 70–99)
GLUCOSE UR STRIP-MCNC: NEGATIVE MG/DL
HBA1C MFR BLD: 5.4 % (ref 0–5.6)
HBV SURFACE AB SERPL IA-ACNC: <3.5 M[IU]/ML
HBV SURFACE AB SERPL IA-ACNC: NONREACTIVE M[IU]/ML
HCO3 SERPL-SCNC: 26 MMOL/L (ref 22–29)
HCT VFR BLD AUTO: 39.1 % (ref 35–47)
HCV AB SERPL QL IA: NONREACTIVE
HDLC SERPL-MCNC: 34 MG/DL
HGB BLD-MCNC: 12.8 G/DL (ref 11.7–15.7)
HGB UR QL STRIP: NEGATIVE
HIV 1+2 AB+HIV1 P24 AG SERPL QL IA: NONREACTIVE
IRON BINDING CAPACITY (ROCHE): 317 UG/DL (ref 240–430)
IRON SATN MFR SERPL: 23 % (ref 15–46)
IRON SERPL-MCNC: 72 UG/DL (ref 37–145)
KETONES UR STRIP-MCNC: NEGATIVE MG/DL
LDLC SERPL CALC-MCNC: 87 MG/DL
LEUKOCYTE ESTERASE UR QL STRIP: NEGATIVE
MCH RBC QN AUTO: 29.8 PG (ref 26.5–33)
MCHC RBC AUTO-ENTMCNC: 32.7 G/DL (ref 31.5–36.5)
MCV RBC AUTO: 91 FL (ref 78–100)
NITRATE UR QL: NEGATIVE
NONHDLC SERPL-MCNC: 153 MG/DL
PH UR STRIP: 7 [PH] (ref 5–8)
PLATELET # BLD AUTO: 345 10E3/UL (ref 150–450)
POTASSIUM SERPL-SCNC: 4.4 MMOL/L (ref 3.4–5.3)
PROT SERPL-MCNC: 7.5 G/DL (ref 6.4–8.3)
RBC # BLD AUTO: 4.29 10E6/UL (ref 3.8–5.2)
SODIUM SERPL-SCNC: 139 MMOL/L (ref 135–145)
SP GR UR STRIP: 1.02 (ref 1–1.03)
TRIGL SERPL-MCNC: 329 MG/DL
TSH SERPL DL<=0.005 MIU/L-ACNC: 1.9 UIU/ML (ref 0.3–4.2)
UROBILINOGEN UR STRIP-ACNC: 0.2 E.U./DL
VIT D+METAB SERPL-MCNC: 46 NG/ML (ref 20–50)
WBC # BLD AUTO: 7.7 10E3/UL (ref 4–11)

## 2024-08-14 PROCEDURE — 83036 HEMOGLOBIN GLYCOSYLATED A1C: CPT | Performed by: FAMILY MEDICINE

## 2024-08-14 PROCEDURE — 90715 TDAP VACCINE 7 YRS/> IM: CPT | Performed by: FAMILY MEDICINE

## 2024-08-14 PROCEDURE — 85027 COMPLETE CBC AUTOMATED: CPT | Performed by: FAMILY MEDICINE

## 2024-08-14 PROCEDURE — 82306 VITAMIN D 25 HYDROXY: CPT | Performed by: FAMILY MEDICINE

## 2024-08-14 PROCEDURE — 86706 HEP B SURFACE ANTIBODY: CPT | Performed by: FAMILY MEDICINE

## 2024-08-14 PROCEDURE — 83550 IRON BINDING TEST: CPT | Performed by: FAMILY MEDICINE

## 2024-08-14 PROCEDURE — 99396 PREV VISIT EST AGE 40-64: CPT | Mod: 25 | Performed by: FAMILY MEDICINE

## 2024-08-14 PROCEDURE — 90471 IMMUNIZATION ADMIN: CPT | Performed by: FAMILY MEDICINE

## 2024-08-14 PROCEDURE — 80053 COMPREHEN METABOLIC PANEL: CPT | Performed by: FAMILY MEDICINE

## 2024-08-14 PROCEDURE — 81003 URINALYSIS AUTO W/O SCOPE: CPT | Performed by: FAMILY MEDICINE

## 2024-08-14 PROCEDURE — 82248 BILIRUBIN DIRECT: CPT | Performed by: FAMILY MEDICINE

## 2024-08-14 PROCEDURE — 99214 OFFICE O/P EST MOD 30 MIN: CPT | Mod: 25 | Performed by: FAMILY MEDICINE

## 2024-08-14 PROCEDURE — 86803 HEPATITIS C AB TEST: CPT | Performed by: FAMILY MEDICINE

## 2024-08-14 PROCEDURE — 83540 ASSAY OF IRON: CPT | Performed by: FAMILY MEDICINE

## 2024-08-14 PROCEDURE — 80061 LIPID PANEL: CPT | Performed by: FAMILY MEDICINE

## 2024-08-14 PROCEDURE — 36415 COLL VENOUS BLD VENIPUNCTURE: CPT | Performed by: FAMILY MEDICINE

## 2024-08-14 PROCEDURE — 82728 ASSAY OF FERRITIN: CPT | Performed by: FAMILY MEDICINE

## 2024-08-14 PROCEDURE — 87389 HIV-1 AG W/HIV-1&-2 AB AG IA: CPT | Performed by: FAMILY MEDICINE

## 2024-08-14 PROCEDURE — 84443 ASSAY THYROID STIM HORMONE: CPT | Performed by: FAMILY MEDICINE

## 2024-08-14 RX ORDER — BETAMETHASONE VALERATE 1.2 MG/G
AEROSOL, FOAM TOPICAL
Qty: 100 G | Refills: 1 | Status: SHIPPED | OUTPATIENT
Start: 2024-08-14

## 2024-08-14 RX ORDER — SODIUM FLUORIDE 6 MG/ML
1 PASTE, DENTIFRICE DENTAL DAILY
Qty: 100 ML | Refills: 3 | Status: SHIPPED | OUTPATIENT
Start: 2024-08-14

## 2024-08-14 RX ORDER — TRIAMCINOLONE ACETONIDE 1 MG/G
OINTMENT TOPICAL 2 TIMES DAILY
Qty: 30 G | Refills: 1 | Status: SHIPPED | OUTPATIENT
Start: 2024-08-14

## 2024-08-14 SDOH — HEALTH STABILITY: PHYSICAL HEALTH: ON AVERAGE, HOW MANY DAYS PER WEEK DO YOU ENGAGE IN MODERATE TO STRENUOUS EXERCISE (LIKE A BRISK WALK)?: 3 DAYS

## 2024-08-14 SDOH — HEALTH STABILITY: PHYSICAL HEALTH: ON AVERAGE, HOW MANY MINUTES DO YOU ENGAGE IN EXERCISE AT THIS LEVEL?: 20 MIN

## 2024-08-14 ASSESSMENT — PAIN SCALES - GENERAL: PAINLEVEL: NO PAIN (0)

## 2024-08-14 ASSESSMENT — SOCIAL DETERMINANTS OF HEALTH (SDOH): HOW OFTEN DO YOU GET TOGETHER WITH FRIENDS OR RELATIVES?: ONCE A WEEK

## 2024-10-11 ENCOUNTER — IMMUNIZATION (OUTPATIENT)
Dept: FAMILY MEDICINE | Facility: CLINIC | Age: 59
End: 2024-10-11
Payer: COMMERCIAL

## 2024-10-11 PROCEDURE — 91320 SARSCV2 VAC 30MCG TRS-SUC IM: CPT

## 2024-10-11 PROCEDURE — 90471 IMMUNIZATION ADMIN: CPT

## 2024-10-11 PROCEDURE — 90673 RIV3 VACCINE NO PRESERV IM: CPT

## 2024-10-11 PROCEDURE — 90480 ADMN SARSCOV2 VAC 1/ONLY CMP: CPT

## 2024-10-21 ENCOUNTER — TELEPHONE (OUTPATIENT)
Dept: DERMATOLOGY | Facility: CLINIC | Age: 59
End: 2024-10-21

## 2024-10-21 ENCOUNTER — OFFICE VISIT (OUTPATIENT)
Dept: DERMATOLOGY | Facility: CLINIC | Age: 59
End: 2024-10-21
Attending: FAMILY MEDICINE
Payer: COMMERCIAL

## 2024-10-21 DIAGNOSIS — L57.0 ACTINIC KERATOSIS: ICD-10-CM

## 2024-10-21 DIAGNOSIS — L30.9 ECZEMA, UNSPECIFIED TYPE: ICD-10-CM

## 2024-10-21 DIAGNOSIS — Q82.8 POROKERATOSIS: ICD-10-CM

## 2024-10-21 DIAGNOSIS — L40.0 PSORIASIS VULGARIS: Primary | ICD-10-CM

## 2024-10-21 PROCEDURE — 17003 DESTRUCT PREMALG LES 2-14: CPT | Mod: GC | Performed by: DERMATOLOGY

## 2024-10-21 PROCEDURE — 17000 DESTRUCT PREMALG LESION: CPT | Mod: GC | Performed by: DERMATOLOGY

## 2024-10-21 PROCEDURE — 99203 OFFICE O/P NEW LOW 30 MIN: CPT | Mod: 25 | Performed by: DERMATOLOGY

## 2024-10-21 RX ORDER — AMMONIUM LACTATE 12 G/100G
CREAM TOPICAL
Qty: 385 G | Refills: 11 | Status: SHIPPED | OUTPATIENT
Start: 2024-10-21

## 2024-10-21 RX ORDER — FLUOCINOLONE ACETONIDE 0.11 MG/ML
OIL TOPICAL
Qty: 118 ML | Refills: 5 | Status: SHIPPED | OUTPATIENT
Start: 2024-10-21

## 2024-10-21 RX ORDER — FLUOCINONIDE 0.5 MG/G
OINTMENT TOPICAL
Qty: 60 G | Refills: 3 | Status: SHIPPED | OUTPATIENT
Start: 2024-10-21

## 2024-10-21 RX ORDER — TRETINOIN 0.25 MG/G
CREAM TOPICAL
Qty: 45 G | Refills: 11 | Status: SHIPPED | OUTPATIENT
Start: 2024-10-21

## 2024-10-21 RX ORDER — CLOBETASOL PROPIONATE 0.5 MG/G
AEROSOL, FOAM TOPICAL
COMMUNITY

## 2024-10-21 RX ORDER — ESCITALOPRAM OXALATE 20 MG/1
TABLET ORAL
COMMUNITY
Start: 2024-10-19

## 2024-10-21 RX ORDER — TRAZODONE HYDROCHLORIDE 50 MG/1
50 TABLET, FILM COATED ORAL
COMMUNITY
Start: 2024-09-11

## 2024-10-21 NOTE — NURSING NOTE
Iman Sánchez's goals for this visit include:   Chief Complaint   Patient presents with    Eczema     Pt is here for eczema, has flare ups on the scalp, elbows and bumps on the legs. Did have confirmed lymes disease about 6 years.       She requests these members of her care team be copied on today's visit information:     PCP: Collette Chou    Referring Provider:  Collette Chou MD  [address] [place] [address]    There were no vitals taken for this visit.    Do you need any medication refills at today's visit?     Bridgette Antunez LPN on 10/21/2024 at 8:53 AM

## 2024-10-21 NOTE — PATIENT INSTRUCTIONS
- Start lidex (fluocinonide) ointment as needed to areas with itchy rash like the arms. OK to use on bug bites. Don't use on face, groin, or armpits  - Start fluocinolone oil to scalp in the evening  - For scaly spots on the legs: tretinoin cream   - For whole legs: Amlactin lotion (over the counter)    Dr. Danie Cervantes at [facility]: [place]

## 2024-10-21 NOTE — PROGRESS NOTES
[facility] Dermatology Note    Encounter Date: Oct 21, 2024    Dermatology Problem List:  1.  Dermatitis, favor psoriasis vulgaris  -Start fluocinolone oil nightly, triamcinolone ointment to elbows and other itchy spots as needed, will try to get home nb UVB  -Prior: clobetasol foam (patient felt like it made her scalp burn), triamcinolone cream  - Outside dermatology: nb UVB, patient reports she was on multiple injectable medications including Humira that worsened her psoriasis, reports she had a biopsy by a second dermatologist which showed more of a spongiotic dermatitis  2. DSAP  - tretinoin 0.025% cream    Impression/Plan:  1.  Dermatitis  Clinically more consistent with psoriasis today including scalp and elbow involvement, however patient does report worsening on psoriasis injectable medications and a biopsy consistent with eczema.  Regardless, her BSA is up to 10%.  Discussed optimizing topical medications with patient. She uses tanning beds multiple times each week, so in order to stop this, will try to obtain nb UVB home unit.     -Start fluocinolone oil nightly to scalp.  - Start fluocinonide ointment to elbows and as needed to other itchy or rashy areas.   - Start amlactin lotion for flaky skin on the legs  - Will order home nb UVB unit    2. Disseminated superficial actinic porokeratosis  - Start tretinoin 0.025% cream to circular, scaly areas on the legs    3. Actinic keratoses    - Cryotherapy today to two lesions    Follow-up in 6 months.     Procedures:  - PROCEDURE - Crotherapy: Lesions (2-nasal dorsum and L chest) were frozen with liquid nitrogen with a 10 sec thaw time for a total of 2 cycles. Usual warnings including bleeding, infection, recurrence, scarring, and pigmentation change were reviewed. After care instructions were reviewed and written hand out was provided.        Staff/Resident:    Sujatha Joseph MD (PGY-4)  Dermatology Resident       Staff Physician Comments:    I saw and evaluated the patient with the resident and I edited the assessment and plan as documented in the note. I was present for the entire minor procedure and examination.    Steve Hicks MD   of Dermatology  Department of Dermatology  [facility]            CC:   Chief Complaint   Patient presents with    Eczema     Pt is here for eczema, has flare ups on the scalp, elbows and bumps on the legs. Did have confirmed lymes disease about 6 years.       History of Present Illness:  Ms. Iman Sánchez is a 59 year old female who presents as a new patient.  Reports she has had skin issues for about 7 years.  Was initially diagnosed with scabies and then felt like her skin was very troublesome after this.  She saw an outside dermatologist who diagnosed her with psoriasis.  She reports that she was on Humira and a few other injection medications which did not help.  She did start light therapy at a providers office which really helped.  She currently goes to the tanning salon a few times a week.  Currently, her scalp is the most bothersome for her.  Using clobetasol foam which is not helpful and she thinks may make it worse.  She is using triamcinolone ointment on her elbows which is not really working.  Also noticing scaly spots on her legs.    Labs:  N/A    Physical exam:  Vitals: There were no vitals taken for this visit.  GEN: This is a well developed, well-nourished female in no acute distress, in a pleasant mood.    SKIN: Cerrato phototype 3  -Gritty pink papules on the nasal dorsum and the left chest  -Well-demarcated scaly plaques with overlying scale on the bilateral elbows  -Scalp with erythema and thicker scale  -On the bilateral lower extremities there are light pink papules with an annular rim of scale  -Numerous hypopigmented macules on the bilateral legs  - No other lesions of concern on areas examined.     Past Medical History:   Past Medical  History:   Diagnosis Date    Arthritis 2015    Depressive disorder 2020     Past Surgical History:   Procedure Laterality Date    COSMETIC SURGERY      facelift and liposuction       Social History:   reports that she has quit smoking. Her smoking use included cigarettes. She has a 1.5 pack-year smoking history. She has never used smokeless tobacco. She reports current alcohol use. She reports that she does not currently use drugs after having used the following drugs: Marijuana.    Family History:  Family History   Problem Relation Age of Onset    Cerebrovascular Disease Mother     Asthma Mother         COPD    Obesity Mother         Struggled with weight during adulthood nut not severe    Osteoarthritis Father         severe    Obesity Father         Struggled with weight towards end of life but not severe    Diverticulitis Father     Other - See Comments Father         back surgeries    No Known Problems Sister     No Known Problems Brother     Stomach Cancer Maternal Grandmother     Asthma Maternal Grandfather     Rheumatoid Arthritis Paternal Grandmother     Coronary Artery Disease Paternal Grandfather     Breast Cancer Cousin         early 40s    Brain Cancer Maternal Uncle         40s       Medications:  Current Outpatient Medications   Medication Sig Dispense Refill    clobetasol propionate (OLUX) 0.05 % external foam Apply topically.      escitalopram (LEXAPRO) 20 MG tablet       sodium fluoride (PREVIDENT 5000 BOOSTER PLUS) 1.1 % PSTE dental paste Apply 2 mLs to affected area daily 100 mL 3    traZODone (DESYREL) 50 MG tablet Take 50 mg by mouth nightly as needed.      triamcinolone (KENALOG) 0.1 % external ointment Apply topically 2 times daily 30 g 1    betamethasone valerate 0.12 % FOAM Apply daily for 7 days and then off for 7 days - apply to scalp for eczema. (Patient not taking: Reported on 10/21/2024) 100 g 1    escitalopram (LEXAPRO) 10 MG tablet Take 1 tablet (10 mg) by mouth daily 60 tablet 1     hydrOXYzine HCl (ATARAX) 10 MG tablet One every six hours for anxiety 20 tablet 0    semaglutide (OZEMPIC, 0.25 OR 0.5 MG/DOSE,) 2 MG/1.5ML SOPN pen Inject 0.75 mg Subcutaneous every 7 days (Patient not taking: Reported on 10/21/2024)       Allergies   Allergen Reactions    Chocolate      Has reaction when it is eaten, gets itchy after consuming.

## 2024-10-21 NOTE — TELEPHONE ENCOUNTER
Called and spoke with pt about when treatment for psoriasis began, and where that treatment was done. Pt stated she has been seeing dermatologists in [place] previously since 2016, but started more serious treatment Jan 1st 2019. Writer informed patient to send over all pt records to document and support home UVB treatment forms for [employer]. Pt stated she would email the records [email or web address] or fax to [phone].    Jennifer Davenport on 10/21/2024 at 2:49 PM

## 2024-10-21 NOTE — LETTER
10/21/2024      Iman Sánchez  [address] [facility] [address]      Dear Colleague,    Thank you for referring your patient, Iman Sánchez, to the [facility]. Please see a copy of my visit note below.    [facility] Dermatology Note    Encounter Date: Oct 21, 2024    Dermatology Problem List:  1.  Dermatitis, favor psoriasis vulgaris  -Start fluocinolone oil nightly, triamcinolone ointment to elbows and other itchy spots as needed, will try to get home nb UVB  -Prior: clobetasol foam (patient felt like it made her scalp burn), triamcinolone cream  - Outside dermatology: nb UVB, patient reports she was on multiple injectable medications including Humira that worsened her psoriasis, reports she had a biopsy by a second dermatologist which showed more of a spongiotic dermatitis  2. DSAP  - tretinoin 0.025% cream    Impression/Plan:  1.  Dermatitis  Clinically more consistent with psoriasis today including scalp and elbow involvement, however patient does report worsening on psoriasis injectable medications and a biopsy consistent with eczema.  Regardless, her BSA is up to 10%.  Discussed optimizing topical medications with patient. She uses tanning beds multiple times each week, so in order to stop this, will try to obtain nb UVB home unit.     -Start fluocinolone oil nightly to scalp.  - Start fluocinonide ointment to elbows and as needed to other itchy or rashy areas.   - Start amlactin lotion for flaky skin on the legs  - Will order home nb UVB unit    2. Disseminated superficial actinic porokeratosis  - Start tretinoin 0.025% cream to circular, scaly areas on the legs    3. Actinic keratoses    - Cryotherapy today to two lesions    Follow-up in 6 months.     Procedures:  - PROCEDURE - Crotherapy: Lesions (2-nasal dorsum and L chest) were frozen with liquid nitrogen with a 10 sec thaw time for a total of 2 cycles. Usual warnings including bleeding,  infection, recurrence, scarring, and pigmentation change were reviewed. After care instructions were reviewed and written hand out was provided.        Staff/Resident:    Sujatha Joseph MD (PGY-4)  Dermatology Resident       Staff Physician Comments:   I saw and evaluated the patient with the resident and I edited the assessment and plan as documented in the note. I was present for the entire minor procedure and examination.    Steve Hicks MD   of Dermatology  Department of Dermatology  [facility]          CC:   Chief Complaint   Patient presents with    Eczema     Pt is here for eczema, has flare ups on the scalp, elbows and bumps on the legs. Did have confirmed lymes disease about 6 years.       History of Present Illness:  Ms. Iman Sánchez is a 59 year old female who presents as a new patient.  Reports she has had skin issues for about 7 years.  Was initially diagnosed with scabies and then felt like her skin was very troublesome after this.  She saw an outside dermatologist who diagnosed her with psoriasis.  She reports that she was on Humira and a few other injection medications which did not help.  She did start light therapy at a providers office which really helped.  She currently goes to the tanning salon a few times a week.  Currently, her scalp is the most bothersome for her.  Using clobetasol foam which is not helpful and she thinks may make it worse.  She is using triamcinolone ointment on her elbows which is not really working.  Also noticing scaly spots on her legs.    Labs:  N/A    Physical exam:  Vitals: There were no vitals taken for this visit.  GEN: This is a well developed, well-nourished female in no acute distress, in a pleasant mood.    SKIN: Cerrato phototype 3  -Gritty pink papules on the nasal dorsum and the left chest  -Well-demarcated scaly plaques with overlying scale on the bilateral elbows  -Scalp with erythema and  thicker scale  -On the bilateral lower extremities there are light pink papules with an annular rim of scale  -Numerous hypopigmented macules on the bilateral legs  - No other lesions of concern on areas examined.     Past Medical History:   Past Medical History:   Diagnosis Date    Arthritis 2015    Depressive disorder 2020     Past Surgical History:   Procedure Laterality Date    COSMETIC SURGERY      facelift and liposuction       Social History:   reports that she has quit smoking. Her smoking use included cigarettes. She has a 1.5 pack-year smoking history. She has never used smokeless tobacco. She reports current alcohol use. She reports that she does not currently use drugs after having used the following drugs: Marijuana.    Family History:  Family History   Problem Relation Age of Onset    Cerebrovascular Disease Mother     Asthma Mother         COPD    Obesity Mother         Struggled with weight during adulthood nut not severe    Osteoarthritis Father         severe    Obesity Father         Struggled with weight towards end of life but not severe    Diverticulitis Father     Other - See Comments Father         back surgeries    No Known Problems Sister     No Known Problems Brother     Stomach Cancer Maternal Grandmother     Asthma Maternal Grandfather     Rheumatoid Arthritis Paternal Grandmother     Coronary Artery Disease Paternal Grandfather     Breast Cancer Cousin         early 40s    Brain Cancer Maternal Uncle         40s       Medications:  Current Outpatient Medications   Medication Sig Dispense Refill    clobetasol propionate (OLUX) 0.05 % external foam Apply topically.      escitalopram (LEXAPRO) 20 MG tablet       sodium fluoride (PREVIDENT 5000 BOOSTER PLUS) 1.1 % PSTE dental paste Apply 2 mLs to affected area daily 100 mL 3    traZODone (DESYREL) 50 MG tablet Take 50 mg by mouth nightly as needed.      triamcinolone (KENALOG) 0.1 % external ointment Apply topically 2 times daily 30 g 1     betamethasone valerate 0.12 % FOAM Apply daily for 7 days and then off for 7 days - apply to scalp for eczema. (Patient not taking: Reported on 10/21/2024) 100 g 1    escitalopram (LEXAPRO) 10 MG tablet Take 1 tablet (10 mg) by mouth daily 60 tablet 1    hydrOXYzine HCl (ATARAX) 10 MG tablet One every six hours for anxiety 20 tablet 0    semaglutide (OZEMPIC, 0.25 OR 0.5 MG/DOSE,) 2 MG/1.5ML SOPN pen Inject 0.75 mg Subcutaneous every 7 days (Patient not taking: Reported on 10/21/2024)       Allergies   Allergen Reactions    Chocolate      Has reaction when it is eaten, gets itchy after consuming.           Again, thank you for allowing me to participate in the care of your patient.        Sincerely,        Steve Hicks MD

## 2024-10-30 ENCOUNTER — MYC MEDICAL ADVICE (OUTPATIENT)
Dept: DERMATOLOGY | Facility: CLINIC | Age: 59
End: 2024-10-30
Payer: COMMERCIAL

## 2024-10-31 ENCOUNTER — MYC MEDICAL ADVICE (OUTPATIENT)
Dept: FAMILY MEDICINE | Facility: CLINIC | Age: 59
End: 2024-10-31

## 2024-10-31 ENCOUNTER — ALLIED HEALTH/NURSE VISIT (OUTPATIENT)
Dept: FAMILY MEDICINE | Facility: CLINIC | Age: 59
End: 2024-10-31
Payer: COMMERCIAL

## 2024-10-31 DIAGNOSIS — Z23 ENCOUNTER FOR VACCINATION: Primary | ICD-10-CM

## 2024-10-31 DIAGNOSIS — Z23 ENCOUNTER FOR IMMUNIZATION: Primary | ICD-10-CM

## 2024-10-31 PROCEDURE — 90471 IMMUNIZATION ADMIN: CPT

## 2024-10-31 PROCEDURE — 90750 HZV VACC RECOMBINANT IM: CPT

## 2024-10-31 NOTE — PROGRESS NOTES
Prior to immunization administration, verified patients identity using patient s name and date of birth. Please see Immunization Activity for additional information.     Is the patient's temperature normal (100.5 or less)? Yes     Patient MEETS CRITERIA. PROCEED with vaccine administration.      Patient instructed to remain in clinic for 15 minutes afterwards, and to report any adverse reactions.      Link to Ancillary Visit Immunization Standing Orders SmartSet     Screening performed by Sheron Chambers MA on 10/31/2024 at 1:54 PM.

## 2024-11-07 ENCOUNTER — ALLIED HEALTH/NURSE VISIT (OUTPATIENT)
Dept: FAMILY MEDICINE | Facility: CLINIC | Age: 59
End: 2024-11-07
Payer: COMMERCIAL

## 2024-11-07 VITALS — TEMPERATURE: 98 F

## 2024-11-07 DIAGNOSIS — Z23 ENCOUNTER FOR VACCINATION: ICD-10-CM

## 2024-11-07 PROCEDURE — 99207 PR NO CHARGE NURSE ONLY: CPT

## 2024-11-07 PROCEDURE — 90746 HEPB VACCINE 3 DOSE ADULT IM: CPT

## 2024-11-07 PROCEDURE — 90472 IMMUNIZATION ADMIN EACH ADD: CPT

## 2024-11-07 NOTE — PROGRESS NOTES
Prior to immunization administration, verified patients identity using patient s name and date of birth. Please see Immunization Activity for additional information.     Screening Questionnaire for Adult Immunization    Are you sick today?   No   Do you have allergies to medications, food, a vaccine component or latex?   No   Have you ever had a serious reaction after receiving a vaccination?   No   Do you have a long-term health problem with heart, lung, kidney, or metabolic disease (e.g., diabetes), asthma, a blood disorder, no spleen, complement component deficiency, a cochlear implant, or a spinal fluid leak?  Are you on long-term aspirin therapy?   No   Do you have cancer, leukemia, HIV/AIDS, or any other immune system problem?   No   Do you have a parent, brother, or sister with an immune system problem?   No   In the past 3 months, have you taken medications that affect  your immune system, such as prednisone, other steroids, or anticancer drugs; drugs for the treatment of rheumatoid arthritis, Crohn s disease, or psoriasis; or have you had radiation treatments?   No   Have you had a seizure, or a brain or other nervous system problem?   No   During the past year, have you received a transfusion of blood or blood    products, or been given immune (gamma) globulin or antiviral drug?   No   For women: Are you pregnant or is there a chance you could become       pregnant during the next month?   No   Have you received any vaccinations in the past 4 weeks?   No     Immunization questionnaire answers were all negative.    I have reviewed the following standing orders:   This patient is due and qualifies for the Hepatitis B vaccine.    Click here for Hepatitis B Standing Order    I have reviewed the vaccines inclusion and exclusion criteria; No concerns regarding eligibility.     Patient instructed to remain in clinic for 15 minutes afterwards, and to report any adverse reactions.     Screening performed by Tessie  ESTEFANY Alfaro MA on 11/7/2024 at 2:57 PM.

## 2024-11-08 ENCOUNTER — MYC MEDICAL ADVICE (OUTPATIENT)
Dept: FAMILY MEDICINE | Facility: CLINIC | Age: 59
End: 2024-11-08
Payer: COMMERCIAL

## 2024-11-08 DIAGNOSIS — Z12.11 COLON CANCER SCREENING: Primary | ICD-10-CM

## 2024-11-18 ENCOUNTER — VIRTUAL VISIT (OUTPATIENT)
Dept: FAMILY MEDICINE | Facility: CLINIC | Age: 59
End: 2024-11-18
Payer: COMMERCIAL

## 2024-11-18 DIAGNOSIS — E66.3 OVERWEIGHT WITH BODY MASS INDEX (BMI) OF 28 TO 28.9 IN ADULT: Primary | ICD-10-CM

## 2024-11-18 DIAGNOSIS — E66.3 OVERWEIGHT WITH BODY MASS INDEX (BMI) OF 28 TO 28.9 IN ADULT: ICD-10-CM

## 2024-11-18 PROCEDURE — 99213 OFFICE O/P EST LOW 20 MIN: CPT | Mod: 95

## 2024-11-18 RX ORDER — TIRZEPATIDE 2.5 MG/.5ML
2.5 INJECTION, SOLUTION SUBCUTANEOUS
Qty: 2 ML | Refills: 0 | Status: SHIPPED | OUTPATIENT
Start: 2024-11-18 | End: 2024-11-21

## 2024-11-18 ASSESSMENT — ANXIETY QUESTIONNAIRES
2. NOT BEING ABLE TO STOP OR CONTROL WORRYING: NEARLY EVERY DAY
GAD7 TOTAL SCORE: 10
6. BECOMING EASILY ANNOYED OR IRRITABLE: NOT AT ALL
8. IF YOU CHECKED OFF ANY PROBLEMS, HOW DIFFICULT HAVE THESE MADE IT FOR YOU TO DO YOUR WORK, TAKE CARE OF THINGS AT HOME, OR GET ALONG WITH OTHER PEOPLE?: VERY DIFFICULT
4. TROUBLE RELAXING: MORE THAN HALF THE DAYS
GAD7 TOTAL SCORE: 10
1. FEELING NERVOUS, ANXIOUS, OR ON EDGE: NEARLY EVERY DAY
IF YOU CHECKED OFF ANY PROBLEMS ON THIS QUESTIONNAIRE, HOW DIFFICULT HAVE THESE PROBLEMS MADE IT FOR YOU TO DO YOUR WORK, TAKE CARE OF THINGS AT HOME, OR GET ALONG WITH OTHER PEOPLE: VERY DIFFICULT
5. BEING SO RESTLESS THAT IT IS HARD TO SIT STILL: NOT AT ALL
GAD7 TOTAL SCORE: 10
3. WORRYING TOO MUCH ABOUT DIFFERENT THINGS: MORE THAN HALF THE DAYS
7. FEELING AFRAID AS IF SOMETHING AWFUL MIGHT HAPPEN: NOT AT ALL
7. FEELING AFRAID AS IF SOMETHING AWFUL MIGHT HAPPEN: NOT AT ALL

## 2024-11-18 ASSESSMENT — PATIENT HEALTH QUESTIONNAIRE - PHQ9
10. IF YOU CHECKED OFF ANY PROBLEMS, HOW DIFFICULT HAVE THESE PROBLEMS MADE IT FOR YOU TO DO YOUR WORK, TAKE CARE OF THINGS AT HOME, OR GET ALONG WITH OTHER PEOPLE: VERY DIFFICULT
SUM OF ALL RESPONSES TO PHQ QUESTIONS 1-9: 19
SUM OF ALL RESPONSES TO PHQ QUESTIONS 1-9: 19

## 2024-11-18 NOTE — PROGRESS NOTES
"Iman is a 59 year old who is being evaluated via a billable video visit.    How would you like to obtain your AVS? MyChart  If the video visit is dropped, the invitation should be resent by: Text to cell phone: [phone]  Will anyone else be joining your video visit? No      Assessment & Plan     Overweight with body mass index (BMI) of 28 to 28.9 in adult  No personal or family history of medullary thyroid carcinoma, pancreatis, or Multiple Endocrine Neoplasia syndrome type 2.  Discussed with patient that medication will need to be sent through prior authorization.  If medication is approved, discussed expectations of meeting patient every 4 weeks for medication up titration.  Discussed medication administration, and common side effects.  Discussed importance of nutritional intake especially adequate protein intake to prevent muscle loss.  Recommended taking daily vitamin supplementation.  Encouraged engaging in physical activity.  - ZEPBOUND 2.5 MG/0.5ML prefilled pen; Inject 0.5 mLs (2.5 mg) subcutaneously every 7 days.    BMI  Estimated body mass index is 26.59 kg/m  as calculated from the following:    Height as of 8/14/24: 1.727 m (5' 8\").    Weight as of 8/14/24: 79.3 kg (174 lb 14.4 oz).   Weight management plan: Discussed healthy diet and exercise guidelines will send zepbound through prior authorization.     Depression Screening Follow Up        11/18/2024     1:15 PM   PHQ   PHQ-9 Total Score 19    Q9: Thoughts of better off dead/self-harm past 2 weeks Not at all        Patient-reported         11/18/2024     1:15 PM   Last PHQ-9   1.  Little interest or pleasure in doing things 3    2.  Feeling down, depressed, or hopeless 3    3.  Trouble falling or staying asleep, or sleeping too much 2    4.  Feeling tired or having little energy 3    5.  Poor appetite or overeating 3    6.  Feeling bad about yourself 3    7.  Trouble concentrating 2    8.  Moving slowly or restless 0    Q9: Thoughts of better " off dead/self-harm past 2 weeks 0    PHQ-9 Total Score 19        Patient-reported     Follow Up Actions Taken  Crisis resource information provided in After Visit Summary  Patient declined referral.     Tamera Valerio is a 59 year old, presenting for the following health issues:  Weight Problem (Weight gain)      11/18/2024     1:10 PM   Additional Questions   Roomed by Constance GIRARD CMA     History of Present Illness       Reason for visit:  Weight Gain      Patient is a 59-year old female presenting for video visit for weight loss management.  Medical history includes eczema, arthritis, hair loss.    Starting weight 188 lbs  Goal 140 lbs   Patient has concerns about weight gain.  Had struggled with weight gain with mature onset.  Reports more rapid weight gain in the past two years since menopause.  Has noted increased cravings- mostly sugar and chocolate.  She reports that cravings seem out of context.  Feels like weight gain has impacted life.  Reports more joint pain, symptoms of depression, decreased self-esteem.  Does not like how her clothes fit.  Was previously on semaglutide through online program.  Didn't feel like medication was disposed correctly and was unsure if she was getting the correct medication.  Denies any weight loss on semaglutide, took for 4-5 months.  Previously tried HCG (human chorionic gonadotropin) pre-COVID and reports moderate weight loss.  Does not engage in moderate physical activity.  Does occasionally walk.    Review of Systems  Review of systems negative otherwise known HPI.    Past Medical History:   Diagnosis Date    Arthritis 2015    Depressive disorder 2020       Past Surgical History:   Procedure Laterality Date    COSMETIC SURGERY      facelift and liposuction       Family History   Problem Relation Age of Onset    Cerebrovascular Disease Mother     Asthma Mother         COPD    Obesity Mother         Struggled with weight during adulthood nut not severe    Osteoarthritis  Father         severe    Obesity Father         Struggled with weight towards end of life but not severe    Diverticulitis Father     Other - See Comments Father         back surgeries    No Known Problems Sister     No Known Problems Brother     Stomach Cancer Maternal Grandmother     Asthma Maternal Grandfather     Rheumatoid Arthritis Paternal Grandmother     Coronary Artery Disease Paternal Grandfather     Breast Cancer Cousin         early 40s    Brain Cancer Maternal Uncle         40s       Social History     Tobacco Use    Smoking status: Former     Current packs/day: 0.50     Average packs/day: 0.5 packs/day for 3.0 years (1.5 ttl pk-yrs)     Types: Cigarettes    Smokeless tobacco: Never   Substance Use Topics    Alcohol use: Yes     Comment: Once a week- light drinker     Current Outpatient Medications   Medication Sig Dispense Refill    ammonium lactate (AMLACTIN) 12 % external cream Apply daily to legs 385 g 11    betamethasone valerate 0.12 % FOAM Apply daily for 7 days and then off for 7 days - apply to scalp for eczema. 100 g 1    clobetasol propionate (OLUX) 0.05 % external foam Apply topically.      escitalopram (LEXAPRO) 20 MG tablet       fluocinolone acetonide (DERMA SMOOTHE/FS BODY) 0.01 % external oil Apply to scalp every evening 118 mL 5    fluocinonide (LIDEX) 0.05 % external ointment Apply to itchy dermatitis on the body. Avoid use on the face, groin, and armpits. 60 g 3    hydrOXYzine HCl (ATARAX) 10 MG tablet One every six hours for anxiety (Patient taking differently: Take 25 mg by mouth. One every six hours for anxiety) 20 tablet 0    sodium fluoride (PREVIDENT 5000 BOOSTER PLUS) 1.1 % PSTE dental paste Apply 2 mLs to affected area daily 100 mL 3    traZODone (DESYREL) 50 MG tablet Take 50 mg by mouth nightly as needed.      tretinoin (RETIN-A) 0.025 % external cream Use nightly for scaly areas on the legs 45 g 11    triamcinolone (KENALOG) 0.1 % external ointment Apply topically 2 times  daily 30 g 1    ZEPBOUND 2.5 MG/0.5ML prefilled pen Inject 0.5 mLs (2.5 mg) subcutaneously every 7 days. 2 mL 0    escitalopram (LEXAPRO) 10 MG tablet Take 1 tablet (10 mg) by mouth daily (Patient not taking: Reported on 11/18/2024) 60 tablet 1    semaglutide (OZEMPIC, 0.25 OR 0.5 MG/DOSE,) 2 MG/1.5ML SOPN pen Inject 0.75 mg subcutaneously every 7 days.       No current facility-administered medications for this visit.       Objective    Vitals - Patient Reported  Weight (Patient Reported): 83 kg (183 lb)  Pain Score: No Pain (0)    Physical Exam   GENERAL: alert and no distress  EYES: Eyes grossly normal to inspection.  No discharge or erythema, or obvious scleral/conjunctival abnormalities.  RESP: No audible wheeze, cough, or visible cyanosis.    SKIN: Visible skin clear. No significant rash, abnormal pigmentation or lesions.  NEURO: Cranial nerves grossly intact.  Mentation and speech appropriate for age.  PSYCH: Appropriate affect, tone, and pace of words      Video-Visit Details    Type of service:  Video Visit   Originating Location (pt. Location): Home    Distant Location (provider location):  On-site  Platform used for Video Visit: John  Signed Electronically by: ALENA Roth CNP

## 2024-11-21 RX ORDER — LIRAGLUTIDE 6 MG/ML
0.6 INJECTION, SOLUTION SUBCUTANEOUS WEEKLY
Qty: 3 ML | Refills: 0 | Status: SHIPPED | OUTPATIENT
Start: 2024-11-21

## 2024-11-22 ENCOUNTER — HOSPITAL ENCOUNTER (OUTPATIENT)
Dept: MAMMOGRAPHY | Facility: CLINIC | Age: 59
Discharge: HOME | End: 2024-11-22
Attending: FAMILY MEDICINE | Admitting: FAMILY MEDICINE
Payer: COMMERCIAL

## 2024-11-22 DIAGNOSIS — Z12.31 SCREENING MAMMOGRAM FOR BREAST CANCER: ICD-10-CM

## 2024-11-22 PROCEDURE — 77063 BREAST TOMOSYNTHESIS BI: CPT

## 2024-11-22 PROCEDURE — 77067 SCR MAMMO BI INCL CAD: CPT

## 2024-11-26 ENCOUNTER — TELEPHONE (OUTPATIENT)
Dept: FAMILY MEDICINE | Facility: CLINIC | Age: 59
End: 2024-11-26
Payer: COMMERCIAL

## 2024-11-26 NOTE — TELEPHONE ENCOUNTER
Retail Pharmacy Prior Authorization Team  Phone: [phone]    EPA REQUEST- UNABLE TO COMPLETE

## 2024-11-29 NOTE — TELEPHONE ENCOUNTER
Central Prior Authorization Team   Phone: [phone]    PA Initiation    Medication: liraglutide - Weight Management (SAXENDA) 18 MG/3ML pen   Insurance Company: [employer] - Phone [phone] Fax [phone]  Pharmacy Filling the Rx: [id number] [place] [facility] - [place], [place] - [facility] [address]  Filling Pharmacy Phone: [phone]  Filling Pharmacy Fax:    Start Date: 11/29/2024

## 2024-12-02 NOTE — TELEPHONE ENCOUNTER
PRIOR AUTHORIZATION DENIED    Medication: liraglutide - Weight Management (SAXENDA) 18 MG/3ML pen     Denial Date: 12/2/2024    Denial Rational:   Per the health plan guideline, HNCA.CP.CPA.332, Liraglutide for Weight Loss (Saxenda), the  clinical criteria below must be met before this request can be approved. Please send us  supporting chart notes and lab results.    1. Member meets one of the following (a, b, or c):  a. BMI >= 30 kg/m2 ;  b. BMI >= 27 kg/m2 with at least one indicator of increased cardiovascular risk (e.g.,  coronary artery/heart disease, hypertension, dyslipidemia, diabetes, elevated waist  circumference) or other obesity-related medical condition (e.g., sleep apnea);  c. If age is between 12 and 17 years, both of the following (i and ii): i. Body weight > 60  kg; ii. Initial BMI corresponding to 30 kg/m2 for adults (obese) by international cutoffs;    2. Documentation supports member s participation in a [employer] approved weight loss  program (e.g., Weight Watchers, Active&Fit) or other weight loss program recommended by the  prescriber that involves a reduced calorie diet, increased physical activity, and behavioral  modification that meets both of the following (a and b):  a. Been actively enrolled in a [employer] approved weight loss program or other weight  loss programs recommended by the prescriber for at least 6 months prior to use of GLP-1  agonist;   b. Will continue to be actively enrolled in a weight loss program while concomitantly  prescribed Saxenda;       Appeal Information: Review the plan's reasons for denial listed above. Please utilize that information to complete letter and provide specific, detailed clinical information/rationale of your patient's health status to address their denial reasons.      Submit an appeal request within 180 calendar days from  the date of this notice. The health insurer will provide a  decision no later than 30 calendar days from receipt of  your  appeal and all necessary information.  Submit the request in writing to:  [facility] Member Appeals and [address] [address]  [place] [address]  Fax: [phone]  Phone: [phone]

## 2024-12-05 ENCOUNTER — MYC MEDICAL ADVICE (OUTPATIENT)
Dept: DERMATOLOGY | Facility: CLINIC | Age: 59
End: 2024-12-05
Payer: COMMERCIAL

## 2025-01-28 ENCOUNTER — APPOINTMENT (OUTPATIENT)
Dept: CT IMAGING | Facility: CLINIC | Age: 60
End: 2025-01-28
Attending: EMERGENCY MEDICINE
Payer: COMMERCIAL

## 2025-01-28 ENCOUNTER — HOSPITAL ENCOUNTER (EMERGENCY)
Facility: CLINIC | Age: 60
Discharge: HOME | End: 2025-01-28
Attending: EMERGENCY MEDICINE | Admitting: EMERGENCY MEDICINE
Payer: COMMERCIAL

## 2025-01-28 VITALS
HEART RATE: 79 BPM | DIASTOLIC BLOOD PRESSURE: 63 MMHG | HEIGHT: 68 IN | TEMPERATURE: 97.5 F | RESPIRATION RATE: 18 BRPM | WEIGHT: 190 LBS | BODY MASS INDEX: 28.79 KG/M2 | OXYGEN SATURATION: 97 % | SYSTOLIC BLOOD PRESSURE: 129 MMHG

## 2025-01-28 DIAGNOSIS — R91.1 PULMONARY NODULE: ICD-10-CM

## 2025-01-28 DIAGNOSIS — R10.11 RUQ ABDOMINAL PAIN: ICD-10-CM

## 2025-01-28 LAB
ALBUMIN SERPL BCG-MCNC: 4 G/DL (ref 3.5–5.2)
ALBUMIN UR-MCNC: NEGATIVE MG/DL
ALP SERPL-CCNC: 124 U/L (ref 40–150)
ALT SERPL W P-5'-P-CCNC: NORMAL U/L
ANION GAP SERPL CALCULATED.3IONS-SCNC: 10 MMOL/L (ref 7–15)
APPEARANCE UR: CLEAR
AST SERPL W P-5'-P-CCNC: 27 U/L (ref 0–45)
BASOPHILS # BLD AUTO: 0 10E3/UL (ref 0–0.2)
BASOPHILS NFR BLD AUTO: 0 %
BILIRUB DIRECT SERPL-MCNC: NORMAL MG/DL
BILIRUB SERPL-MCNC: 0.3 MG/DL
BILIRUB UR QL STRIP: NEGATIVE
BUN SERPL-MCNC: 20.9 MG/DL (ref 6–20)
CALCIUM SERPL-MCNC: 8.7 MG/DL (ref 8.8–10.4)
CHLORIDE SERPL-SCNC: 103 MMOL/L (ref 98–107)
COLOR UR AUTO: ABNORMAL
CREAT SERPL-MCNC: 1.17 MG/DL (ref 0.51–0.95)
EGFRCR SERPLBLD CKD-EPI 2021: 54 ML/MIN/1.73M2
EOSINOPHIL # BLD AUTO: 0.1 10E3/UL (ref 0–0.7)
EOSINOPHIL NFR BLD AUTO: 1 %
ERYTHROCYTE [DISTWIDTH] IN BLOOD BY AUTOMATED COUNT: 13.2 % (ref 10–15)
GLUCOSE SERPL-MCNC: 96 MG/DL (ref 70–99)
GLUCOSE UR STRIP-MCNC: NEGATIVE MG/DL
HCO3 SERPL-SCNC: 22 MMOL/L (ref 22–29)
HCT VFR BLD AUTO: 37.2 % (ref 35–47)
HGB BLD-MCNC: 12.5 G/DL (ref 11.7–15.7)
HGB UR QL STRIP: NEGATIVE
IMM GRANULOCYTES # BLD: 0 10E3/UL
IMM GRANULOCYTES NFR BLD: 0 %
KETONES UR STRIP-MCNC: NEGATIVE MG/DL
LACTATE SERPL-SCNC: 0.9 MMOL/L (ref 0.7–2)
LEUKOCYTE ESTERASE UR QL STRIP: ABNORMAL
LIPASE SERPL-CCNC: 25 U/L (ref 13–60)
LYMPHOCYTES # BLD AUTO: 2 10E3/UL (ref 0.8–5.3)
LYMPHOCYTES NFR BLD AUTO: 21 %
MCH RBC QN AUTO: 30.3 PG (ref 26.5–33)
MCHC RBC AUTO-ENTMCNC: 33.6 G/DL (ref 31.5–36.5)
MCV RBC AUTO: 90 FL (ref 78–100)
MONOCYTES # BLD AUTO: 0.8 10E3/UL (ref 0–1.3)
MONOCYTES NFR BLD AUTO: 8 %
MUCOUS THREADS #/AREA URNS LPF: PRESENT /LPF
NEUTROPHILS # BLD AUTO: 6.7 10E3/UL (ref 1.6–8.3)
NEUTROPHILS NFR BLD AUTO: 69 %
NITRATE UR QL: NEGATIVE
NRBC # BLD AUTO: 0 10E3/UL
NRBC BLD AUTO-RTO: 0 /100
PH UR STRIP: 5.5 [PH] (ref 5–7)
PLATELET # BLD AUTO: 297 10E3/UL (ref 150–450)
POTASSIUM SERPL-SCNC: 4.7 MMOL/L (ref 3.4–5.3)
PROT SERPL-MCNC: 7.1 G/DL (ref 6.4–8.3)
RADIOLOGIST FLAGS: NORMAL
RBC # BLD AUTO: 4.12 10E6/UL (ref 3.8–5.2)
RBC URINE: 2 /HPF
SODIUM SERPL-SCNC: 135 MMOL/L (ref 135–145)
SP GR UR STRIP: 1.02 (ref 1–1.03)
SQUAMOUS EPITHELIAL: <1 /HPF
UROBILINOGEN UR STRIP-MCNC: <2 MG/DL
WBC # BLD AUTO: 9.7 10E3/UL (ref 4–11)
WBC URINE: 3 /HPF

## 2025-01-28 PROCEDURE — 85041 AUTOMATED RBC COUNT: CPT | Performed by: EMERGENCY MEDICINE

## 2025-01-28 PROCEDURE — 258N000003 HC RX IP 258 OP 636: Performed by: EMERGENCY MEDICINE

## 2025-01-28 PROCEDURE — 250N000011 HC RX IP 250 OP 636: Performed by: EMERGENCY MEDICINE

## 2025-01-28 PROCEDURE — 99285 EMERGENCY DEPT VISIT HI MDM: CPT | Mod: 25

## 2025-01-28 PROCEDURE — 74177 CT ABD & PELVIS W/CONTRAST: CPT

## 2025-01-28 PROCEDURE — 250N000013 HC RX MED GY IP 250 OP 250 PS 637: Performed by: EMERGENCY MEDICINE

## 2025-01-28 PROCEDURE — 82040 ASSAY OF SERUM ALBUMIN: CPT | Performed by: EMERGENCY MEDICINE

## 2025-01-28 PROCEDURE — 85018 HEMOGLOBIN: CPT | Performed by: EMERGENCY MEDICINE

## 2025-01-28 PROCEDURE — 84155 ASSAY OF PROTEIN SERUM: CPT | Performed by: EMERGENCY MEDICINE

## 2025-01-28 PROCEDURE — 36415 COLL VENOUS BLD VENIPUNCTURE: CPT | Performed by: EMERGENCY MEDICINE

## 2025-01-28 PROCEDURE — 81001 URINALYSIS AUTO W/SCOPE: CPT | Performed by: EMERGENCY MEDICINE

## 2025-01-28 PROCEDURE — 80051 ELECTROLYTE PANEL: CPT | Performed by: EMERGENCY MEDICINE

## 2025-01-28 PROCEDURE — 80048 BASIC METABOLIC PNL TOTAL CA: CPT | Performed by: EMERGENCY MEDICINE

## 2025-01-28 PROCEDURE — 85004 AUTOMATED DIFF WBC COUNT: CPT | Performed by: EMERGENCY MEDICINE

## 2025-01-28 PROCEDURE — 82565 ASSAY OF CREATININE: CPT | Performed by: EMERGENCY MEDICINE

## 2025-01-28 PROCEDURE — 83690 ASSAY OF LIPASE: CPT | Performed by: EMERGENCY MEDICINE

## 2025-01-28 PROCEDURE — 83605 ASSAY OF LACTIC ACID: CPT | Performed by: EMERGENCY MEDICINE

## 2025-01-28 RX ORDER — ONDANSETRON 2 MG/ML
4 INJECTION INTRAMUSCULAR; INTRAVENOUS EVERY 30 MIN PRN
Status: DISCONTINUED | OUTPATIENT
Start: 2025-01-28 | End: 2025-01-28

## 2025-01-28 RX ORDER — IOPAMIDOL 755 MG/ML
90 INJECTION, SOLUTION INTRAVASCULAR ONCE
Status: COMPLETED | OUTPATIENT
Start: 2025-01-28 | End: 2025-01-28

## 2025-01-28 RX ORDER — DICYCLOMINE HCL 20 MG
20 TABLET ORAL 4 TIMES DAILY PRN
Qty: 20 TABLET | Refills: 0 | Status: SHIPPED | OUTPATIENT
Start: 2025-01-28 | End: 2025-02-07

## 2025-01-28 RX ORDER — LORAZEPAM 1 MG/1
1 TABLET ORAL ONCE
Status: COMPLETED | OUTPATIENT
Start: 2025-01-28 | End: 2025-01-28

## 2025-01-28 RX ADMIN — IOPAMIDOL 90 ML: 755 INJECTION, SOLUTION INTRAVENOUS at 17:22

## 2025-01-28 RX ADMIN — LORAZEPAM 1 MG: 1 TABLET ORAL at 16:17

## 2025-01-28 RX ADMIN — SODIUM CHLORIDE 500 ML: 9 INJECTION, SOLUTION INTRAVENOUS at 17:52

## 2025-01-28 ASSESSMENT — COLUMBIA-SUICIDE SEVERITY RATING SCALE - C-SSRS
2. HAVE YOU ACTUALLY HAD ANY THOUGHTS OF KILLING YOURSELF IN THE PAST MONTH?: NO
1. IN THE PAST MONTH, HAVE YOU WISHED YOU WERE DEAD OR WISHED YOU COULD GO TO SLEEP AND NOT WAKE UP?: NO
6. HAVE YOU EVER DONE ANYTHING, STARTED TO DO ANYTHING, OR PREPARED TO DO ANYTHING TO END YOUR LIFE?: NO

## 2025-01-28 ASSESSMENT — ACTIVITIES OF DAILY LIVING (ADL)
ADLS_ACUITY_SCORE: 41

## 2025-01-28 NOTE — ED TRIAGE NOTES
"Pt arrives with c/o RUQ abdominal pain that comes and goes. Has had this for one year but the pain now when it comes last longer. She has c/o right flank pain but does not coincide with her abdominal pain. Pt reports she feels a bump in abdomen that she \"rubs out\" during a flare up. Recommended to come her by Triage nurse to r/o gallbladder issue or infection.      Triage Assessment (Adult)       Row Name 01/28/25 0991          Triage Assessment    Airway WDL WDL        Respiratory WDL    Respiratory WDL WDL        Peripheral/Neurovascular WDL    Peripheral Neurovascular WDL WDL        Cognitive/Neuro/Behavioral WDL    Cognitive/Neuro/Behavioral WDL WDL                     "

## 2025-01-28 NOTE — ED PROVIDER NOTES
EMERGENCY DEPARTMENT ENCOUNTER      NAME: Iman Sánchez  AGE: 59 year old female  YOB: 1965  MRN: [id number]  EVALUATION DATE & TIME: No admission date for patient encounter.    PCP: Collette Chou    ED PROVIDER: Rebekah Taveras DO      Chief Complaint   Patient presents with    Abdominal Pain    Flank Pain         FINAL IMPRESSION:  1. RUQ abdominal pain    2. Pulmonary nodule          ED COURSE & MEDICAL DECISION MAKING:    Pertinent Labs & Imaging studies reviewed. (See chart for details)  2:56 PM I met the patient and performed my initial interview and exam.  3:40 PM Nursing reports several syncopal events with IV placement and requesting medication for anxiety.  6:00 PM Discussed with patient.  She adds that she is on a GLP-1 antagonist and started this around the same time as her symptoms.    59 year old female presents to the Emergency Department for evaluation of right upper quadrant abdominal pain.  Symptoms intermittent for quite some time, acute episode this morning.  Differential includes but not limited to musculoskeletal pain, cholelithiasis, cholecystitis, nephrolithiasis, pyelonephritis among others.  Patient does have a red patch to her abdomen but admits that she had an ice pack to that spot earlier today.  No signs of shingles.  She is nontoxic-appearing and pain is pretty much resolved.  Patient agreeable to lab evaluation and CT imaging to further evaluate.  Patient did have recurrent syncopal episodes with IV placement was given medication for her anxiety.  UA without evidence of infection.  No lactic acidosis.  CT shows diarrheal state, mild hepatic steatosis and a few small pulmonary nodules.  Patient denies any current diarrhea or history of diarrhea with the symptoms in the past.  She has no pain here.  Patient does add that she is on a GLP-1 antagonist and seems like her symptoms started when she did start that.  Certainly could be related.  We discussed symptomatic  treatment for home.  Will write for as needed dicyclomine that she can try.  Encourage close follow-up with her primary provider to further evaluate her symptoms, need for medication changes and possible GI consult if ongoing symptom    At the conclusion of the encounter I discussed the results of all of the tests and the disposition. The questions were answered. The patient or family acknowledged understanding and was agreeable with the care plan.     Medical Decision Making  Obtained supplemental history:Supplemental history obtained?: Documented in chart  Reviewed external records: External records reviewed?: Documented in chart  Care impacted by chronic illness:Documented in Chart  Did you consider but not order tests?: Work up considered but not performed and documented in chart, if applicable  Did you interpret images independently?: Independent interpretation of ECG and images noted in documentation, when applicable.  Consultation discussion with other provider:Did you involve another provider (consultant, , pharmacy, etc.)?: No  Discharge. I prescribed additional prescription strength medication(s) as charted. See documentation for any additional details.    MIPS: Not Applicable      MEDICATIONS GIVEN IN THE EMERGENCY:  Medications   LORazepam (ATIVAN) tablet 1 mg (1 mg Oral $Given 1/28/25 1617)   sodium chloride 0.9% BOLUS 500 mL (0 mLs Intravenous Stopped 1/28/25 1816)   iopamidol (ISOVUE-370) solution 90 mL (90 mLs Intravenous $Given 1/28/25 1722)       NEW PRESCRIPTIONS STARTED AT TODAY'S ER VISIT  Discharge Medication List as of 1/28/2025  6:16 PM        START taking these medications    Details   dicyclomine (BENTYL) 20 MG tablet Take 1 tablet (20 mg) by mouth 4 times daily as needed (abdominal pain)., Disp-20 tablet, R-0, E-Prescribe                =================================================================    HPI    Patient information was obtained from: Patient and chart review    Use of  : N/A         Iman Sánchez is a 59 year old female with a pertinent history of depression who presents to this ED  for evaluation of abdominal pain.  Intermittent pain over the past 1 year.  Reports episodes around once a month.  Spasm like pain, seems to come out of nowhere.  Will last around an hour.  Some throbbing pain afterwards.  Has tried ice with this.  Now episodes lasting around 20 minutes.  Last episode today.  No vomiting or fever.  No dysuria or concerning vaginal discharge.  No prior abdominal surgeries.  No shortness of breath or cough.      REVIEW OF SYSTEMS   Per HPI    PAST MEDICAL HISTORY:  Past Medical History:   Diagnosis Date    Arthritis 2015    Depressive disorder 2020       PAST SURGICAL HISTORY:  Past Surgical History:   Procedure Laterality Date    COSMETIC SURGERY      facelift and liposuction           CURRENT MEDICATIONS:    dicyclomine (BENTYL) 20 MG tablet  ammonium lactate (AMLACTIN) 12 % external cream  betamethasone valerate 0.12 % FOAM  clobetasol propionate (OLUX) 0.05 % external foam  escitalopram (LEXAPRO) 10 MG tablet  escitalopram (LEXAPRO) 20 MG tablet  fluocinolone acetonide (DERMA SMOOTHE/FS BODY) 0.01 % external oil  fluocinonide (LIDEX) 0.05 % external ointment  liraglutide - Weight Management (SAXENDA) 18 MG/3ML pen  sodium fluoride (PREVIDENT 5000 BOOSTER PLUS) 1.1 % PSTE dental paste  traZODone (DESYREL) 50 MG tablet  tretinoin (RETIN-A) 0.025 % external cream  triamcinolone (KENALOG) 0.1 % external ointment         ALLERGIES:  Allergies   Allergen Reactions    Chocolate      Has reaction when it is eaten, gets itchy after consuming.       FAMILY HISTORY:  Family History   Problem Relation Age of Onset    Cerebrovascular Disease Mother     Asthma Mother         COPD    Obesity Mother         Struggled with weight during adulthood nut not severe    Osteoarthritis Father         severe    Obesity Father         Struggled with weight towards end of life but  not severe    Diverticulitis Father     Other - See Comments Father         back surgeries    No Known Problems Sister     No Known Problems Brother     Stomach Cancer Maternal Grandmother     Asthma Maternal Grandfather     Rheumatoid Arthritis Paternal Grandmother     Coronary Artery Disease Paternal Grandfather     Breast Cancer Cousin         early 40s    Brain Cancer Maternal Uncle         40s       SOCIAL HISTORY:   Social History     Socioeconomic History    Marital status:    Tobacco Use    Smoking status: Former     Current packs/day: 0.50     Average packs/day: 0.5 packs/day for 3.0 years (1.5 ttl pk-yrs)     Types: Cigarettes    Smokeless tobacco: Never   Vaping Use    Vaping status: Never Used   Substance and Sexual Activity    Alcohol use: Yes     Comment: Once a week- light drinker    Drug use: Not Currently     Types: Marijuana     Comment: Not since 2019    Sexual activity: Not Currently     Partners: Male     Birth control/protection: Condom   Other Topics Concern    Parent/sibling w/ CABG, MI or angioplasty before 65F 55M? No     Social Drivers of Health     Financial Resource Strain: Low Risk  (8/14/2024)    Financial Resource Strain     Within the past 12 months, have you or your family members you live with been unable to get utilities (heat, electricity) when it was really needed?: No   Food Insecurity: Low Risk  (8/14/2024)    Food Insecurity     Within the past 12 months, did you worry that your food would run out before you got money to buy more?: No     Within the past 12 months, did the food you bought just not last and you didn t have money to get more?: No   Transportation Needs: Low Risk  (8/14/2024)    Transportation Needs     Within the past 12 months, has lack of transportation kept you from medical appointments, getting your medicines, non-medical meetings or appointments, work, or from getting things that you need?: No   Physical Activity: Insufficiently Active (8/14/2024)  "   Exercise Vital Sign     Days of Exercise per Week: 3 days     Minutes of Exercise per Session: 20 min   Stress: Stress Concern Present (8/14/2024)    [employer] - Occupational Stress Questionnaire     Feeling of Stress : Very much   Social Connections: Unknown (8/14/2024)    Social Connection and Isolation Panel [NHANES]     Frequency of Social Gatherings with Friends and Family: Once a week   Interpersonal Safety: Low Risk  (8/14/2024)    Interpersonal Safety     Do you feel physically and emotionally safe where you currently live?: Yes     Within the past 12 months, have you been hit, slapped, kicked or otherwise physically hurt by someone?: No     Within the past 12 months, have you been humiliated or emotionally abused in other ways by your partner or ex-partner?: No   Housing Stability: Low Risk  (8/14/2024)    Housing Stability     Do you have housing? : Yes     Are you worried about losing your housing?: No       VITALS:  /63   Pulse 79   Temp 97.5  F (36.4  C) (Oral)   Resp 18   Ht 1.715 m (5' 7.5\")   Wt 86.2 kg (190 lb)   SpO2 97%   BMI 29.32 kg/m      PHYSICAL EXAM    Physical Exam  Constitutional:       General: She is not in acute distress.  HENT:      Head: Normocephalic and atraumatic.      Mouth/Throat:      Pharynx: Oropharynx is clear.   Eyes:      Pupils: Pupils are equal, round, and reactive to light.   Cardiovascular:      Rate and Rhythm: Normal rate and regular rhythm.      Pulses: Normal pulses.      Heart sounds: Normal heart sounds.   Pulmonary:      Effort: Pulmonary effort is normal.      Breath sounds: Normal breath sounds.   Abdominal:      General: Abdomen is flat. Bowel sounds are normal.      Palpations: Abdomen is soft.      Tenderness: There is no abdominal tenderness.   Musculoskeletal:         General: Normal range of motion.   Skin:     General: Skin is warm and dry.      Capillary Refill: Capillary refill takes less than 2 seconds. "      Comments: Blanchable non raised red lesion to RUQ at area of prior ice pack placement   Neurological:      General: No focal deficit present.      Mental Status: She is alert and oriented to person, place, and time.             LAB:  All pertinent labs reviewed and interpreted.  Labs Ordered and Resulted from Time of ED Arrival to Time of ED Departure   BASIC METABOLIC PANEL - Abnormal       Result Value    Sodium 135      Potassium 4.7      Chloride 103      Carbon Dioxide (CO2) 22      Anion Gap 10      Urea Nitrogen 20.9 (*)     Creatinine 1.17 (*)     GFR Estimate 54 (*)     Calcium 8.7 (*)     Glucose 96     ROUTINE UA WITH MICROSCOPIC - Abnormal    Color Urine Light Yellow      Appearance Urine Clear      Glucose Urine Negative      Bilirubin Urine Negative      Ketones Urine Negative      Specific Gravity Urine 1.023      Blood Urine Negative      pH Urine 5.5      Protein Albumin Urine Negative      Urobilinogen Urine <2.0      Nitrite Urine Negative      Leukocyte Esterase Urine 25 Sang/uL (*)     Mucus Urine Present (*)     RBC Urine 2      WBC Urine 3      Squamous Epithelials Urine <1     LIPASE - Normal    Lipase 25     LACTIC ACID WHOLE BLOOD WITH 1X REPEAT IN 2 HR WHEN >2 - Normal    Lactic Acid, Initial 0.9     HEPATIC FUNCTION PANEL    Protein Total 7.1      Albumin 4.0      Bilirubin Total 0.3      Alkaline Phosphatase 124      AST 27      ALT        Bilirubin Direct       CBC WITH PLATELETS AND DIFFERENTIAL    WBC Count 9.7      RBC Count 4.12      Hemoglobin 12.5      Hematocrit 37.2      MCV 90      MCH 30.3      MCHC 33.6      RDW 13.2      Platelet Count 297      % Neutrophils 69      % Lymphocytes 21      % Monocytes 8      % Eosinophils 1      % Basophils 0      % Immature Granulocytes 0      NRBCs per 100 WBC 0      Absolute Neutrophils 6.7      Absolute Lymphocytes 2.0      Absolute Monocytes 0.8      Absolute Eosinophils 0.1      Absolute Basophils 0.0      Absolute Immature  Granulocytes 0.0      Absolute NRBCs 0.0         RADIOLOGY:  Reviewed all pertinent imaging. Please see official radiology report.  CT Abdomen Pelvis w Contrast   Final Result   IMPRESSION:    1.  Diarrheal state.   2.  Mild hepatic steatosis.   3.  Few small pulmonary nodules, largest measuring 6 mm in the left lower lobe. Follow-up recommendations, as per below.         2017 Fleischner Society Recommendations for Solid Pulmonary Nodules      Nodule size greater than 6 mm up to 8 mm:      Single nodule:      Low risk: CT at 6-12 months, then consider CT at 18-24 months   High risk: CT at 6-12 months, then CT at 18-24 months      Multiple nodules:      Low risk: CT at 3-6 months, then consider CT at 18-24 months   High risk: CT at 3-6 months, then CT at 18-24 months         [Recommend Follow Up: Lung nodule]      This report will be copied to the [facility] to ensure a provider acknowledges the finding.              Rebekah Taveras DO  Emergency Medicine  [facility] EMERGENCY ROOM  [address] [address] [phone]  Dept: [phone]       Rebekah Taveras DO  01/28/25 2149

## 2025-01-29 ENCOUNTER — PATIENT OUTREACH (OUTPATIENT)
Dept: FAMILY MEDICINE | Facility: CLINIC | Age: 60
End: 2025-01-29
Payer: COMMERCIAL

## 2025-01-29 NOTE — DISCHARGE INSTRUCTIONS
As discussed your testing was reassuring here today.  Mild to fatty liver and some incidental pulmonary nodules as well as some loose stool in your colon however no obvious explanation for your symptoms  As discussed your GLP-1 agonist could be contributing to your symptoms, I would discuss this with your primary doctor  [place] as needed if ongoing symptoms  Follow close with your doctor, if ongoing symptoms may recommend GI referral.

## 2025-01-29 NOTE — TELEPHONE ENCOUNTER
"  Transitions of Care Outreach  No chief complaint on file.      Most Recent Admission Date: 1/28/2025   Most Recent Admission Diagnosis:      Most Recent Discharge Date: 1/28/2025   Most Recent Discharge Diagnosis: RUQ abdominal pain - R10.11  Pulmonary nodule - R91.1     Transitions of Care Assessment    Discharge Assessment  How are you doing now that you are home?: endorses slight soreness in abdomen from \"after effect \" of the spasm. improving slowly, not worse than when went in to ER  How are your symptoms? (Red Flag symptoms escalate to triage hotline per guidelines): Improved  Do you know how to contact your clinic care team if you have future questions or changes to your health status? : Yes  Does the patient have their discharge instructions? : Yes  Does the patient have questions regarding their discharge instructions? : No  Were you started on any new medications or were there changes to any of your previous medications? : Yes  Does the patient have all of their medications?: No (see comment) (has not yet picked up Bentyl, but will  today)  Do you have questions regarding any of your medications? : No  Do you have all of your needed medical supplies or equipment (DME)?  (i.e. oxygen tank, CPAP, cane, etc.): Yes    Follow up Plan     Discharge Follow-Up  Discharge follow up appointment scheduled in alignment with recommended follow up timeframe or Transitions of Risk Category? (Low = within 30 days; Moderate= within 14 days; High= within 7 days): Yes  Discharge Follow Up Appointment Date: 01/31/25  Discharge Follow Up Appointment Scheduled with?: Primary Care Provider    Future Appointments   Date Time Provider Department Center   1/31/2025  2:40 PM [facility]ino Mer[facility] MD [facility]   4/23/2025 10:15 AM Steve Hicks MD [facility]       Outpatient Plan as outlined on AVS reviewed with patient.    For any urgent concerns, please contact our 24 hour nurse triage line: [phone] " ([phone])       Susi NUGENT RN

## 2025-03-28 ENCOUNTER — TRANSFERRED RECORDS (OUTPATIENT)
Dept: LAB | Facility: CLINIC | Age: 60
End: 2025-03-28
Payer: COMMERCIAL

## 2025-04-01 NOTE — PROCEDURES
[facility]   [address], [address], [place] [address]     Patient Name: Iman Sánchez  Gender:  Female  Exam Date: 03/28/2025 Visit Number:  [id number]  Age: 59 Years YOB: 1965  Attending MD: Brenda Waters MD Medical Record#:  [id number]    Procedure: Colonoscopy   Indications: Colorectal cancer screening      Referring MD: Collette Chou MD  Primary MD:      Collette Chou MD  Medications: Admitting Medications:   0.9% Normal Saline at TKO   Intra Procedure Medications:   Patient received monitored anesthesia care.     Complications: No immediate complications  ______________________________________________________________________________  Procedure:   An examination of the heart and lungs was performed and found to be within acceptable limits.  .  The patient was therefore deemed a reasonable candidate for endoscopy and sedation.   The risks and benefits of the procedure were explained to the patient.After obtaining informed consent, the patient received monitored anesthesia care and I passed the scope   without difficulty via the rectum to the cecum.  The appendiceal orifice and ic valve were identified.  The scope was retroflexed during the examination  The quality of the prep was excellent  (Miralax/Gatorade/2 tablets Bisacodyl/Magnesium Citrate).    This was a complete examination throughout the entire colon.    Findings:    Polyp location: transverse colon.  Quantity: 1.  Size: 3 mm.  Polyp shape:  sessile.         Maneuver: polypectomy was performed with a cold snare.       Removal:  complete.  Retrieval: complete.  Bleeding: none.    Diverticulosis.  Location: - distal descending colon - sigmoid.    Description:  mild.    Size:  small.    Quantity:  few.  No inflammation present.  Anal canal:  small external hemorrhoid(s)  Remainder of the exam is normal.    Impression:  Colorectal polyp detected on colonoscopy  Diverticulosis    Preliminary Plan:  The patient and their  physician will receive a copy of the pathology report as well as pathology-based recommendations for future screening or surveillance.  Return to your primary care provider as needed.  Pathology Results:  A: COLON, TRANSVERSE, POLYP:           1. Tubular adenoma           2. Negative for high grade dysplasia           3. Per the colonoscopy report:               a. Polyp size: 3 mm               b. Resection: Complete               c. Retrieval: Complete      MICROSCOPIC  A: Performed     Electronically signed by: Moses Morris MD    Interpreted at [facility], [address] [place] [id number]    Orders    Instruction(s)/Education:  Instruction/Education Timeframe Assessment   Colon Polyps  K63.5   Diverticulosis/Diverticulitis  K63.5   Hemorrhoids  K63.5   High Fiber Diet  K63.5       Final Plan:  Follow up as previously discussed.  Repeat colonoscopy in 5 years.    We will attempt to contact you at appropriate intervals via [place] mail.  We may not be able to find you or contact you at that time, therefore you should know that the responsibility for following our recommendation rests with you.  If you don't hear from us at the time your procedure is due, please contact our office to schedule an appointment.  If your contact information should change, please contact our office so that we can update your record.      _Electronically signed by:___________________  Steve Mccarthy MD                 03/28/2025    cc: Collette Chou MD  cc: Collette Chou MD

## 2025-05-08 PROBLEM — K63.5 POLYP OF COLON: Status: ACTIVE | Noted: 2025-03-28

## 2025-05-08 PROBLEM — K57.90 DIVERTICULAR DISEASE: Status: ACTIVE | Noted: 2025-03-28

## 2025-05-08 PROBLEM — D12.3 BENIGN NEOPLASM OF TRANSVERSE COLON: Status: ACTIVE | Noted: 2025-04-01

## 2025-07-15 ENCOUNTER — NURSE TRIAGE (OUTPATIENT)
Dept: FAMILY MEDICINE | Facility: CLINIC | Age: 60
End: 2025-07-15
Payer: COMMERCIAL

## 2025-07-15 ENCOUNTER — PATIENT OUTREACH (OUTPATIENT)
Dept: CARE COORDINATION | Facility: CLINIC | Age: 60
End: 2025-07-15
Payer: COMMERCIAL

## 2025-07-15 NOTE — TELEPHONE ENCOUNTER
Reason for Call:  Appointment Request    Patient requesting this type of appt:  Preventive     Requested provider: Collette Chou    Reason patient unable to be scheduled: Not within requested timeframe    When does patient want to be seen/preferred time: Something sooner    Comments: n/a    Could we send this information to you in [employer] or would you prefer to receive a phone call?:   Patient would prefer a phone call   Okay to leave a detailed message?: Yes at Cell number on file:    Telephone Information:   Mobile [phone]       Call taken on 7/15/2025 at 4:04 PM by Maddie Ragsdale

## 2025-07-16 NOTE — TELEPHONE ENCOUNTER
Nurse Triage SBAR    Is this a 2nd Level Triage? NO    Situation: Patient called in with concerns of urinary urgency.     Background: Patient stated that she has had this issue for years but gotten worse recently.     Assessment: Patient stated that she has had this urgency issue for years but recently has been about every 30 min she will have the sudden urgency to urinate and she says it comes on fast and strong and even if she has an empty bladder. Sometimes very little will come out.     Patient reported she will leak a little as well and will have to use liners.     Patient denied any other urinary symptoms, no foul odor, no pain, no blood.     Patient also stated that she is not sure if it is related or not but she also has issues with eczema and she can feel two spots in her vaginal area that hurt like a cut  but she can't find them there is no visible concerns.     Patient also wanted to report that she has also started to have heart burn once every couple weeks but is unsure if that means anything.     Protocol Recommended Disposition:   See in Office Today or Tomorrow    Recommendation: Patient was given home care advise and was scheduled with Shania Welch on 7/17/25.      Alex Del Valle RN  [facility]       Reason for Disposition   Patient wants to be seen    Additional Information   Negative: Shock suspected (e.g., cold/pale/clammy skin, too weak to stand, low BP, rapid pulse)   Negative: Sounds like a life-threatening emergency to the triager   Negative: Followed a female genital area injury (e.g., labia, vagina, vulva)   Negative: Followed a male genital area injury (penis, scrotum)   Negative: Vaginal discharge   Negative: Pus (white, yellow) or bloody discharge from end of penis   Negative: Pain or burning with passing urine (urination) and pregnant   Negative: Pain or burning with passing urine (urination) and female   Negative: Pain or burning with passing urine (urination) and  "male   Negative: Pain or itching in the vulvar area   Negative: Pain in scrotum is main symptom   Negative: Blood in the urine is main symptom   Negative: Symptoms arising from use of a urinary catheter (e.g., coude, Vazquez)   Negative: Unable to urinate (or only a few drops) > 4 hours and bladder feels very full (e.g., palpable bladder or strong urge to urinate)   Negative: Decreased urination and drinking very little and dehydration suspected (e.g., dark urine, no urine > 12 hours, very dry mouth, very lightheaded)   Negative: Patient sounds very sick or weak to the triager   Negative: Fever > 100.4 F  (38.0 C)   Negative: Side (flank) or lower back pain present   Negative: Bad or foul-smelling urine   Negative: Urinating more frequently than usual (i.e., frequency) OR new-onset of the feeling of an urgent need to urinate (i.e., urgency)   Negative: Can't control passage of urine (i.e., urinary incontinence) and new-onset (< 2 weeks) or getting worse    Answer Assessment - Initial Assessment Questions  1. SYMPTOM: \"What's the main symptom you're concerned about?\" (e.g., frequency, incontinence)      Urine urgency every 30 min or so   2. ONSET: \"When did the  urgency  start?\"      Started a couple years and did discuss it during last years annual.   3. PAIN: \"Is there any pain?\" If Yes, ask: \"How bad is it?\" (Scale: 1-10; mild, moderate, severe)      none  4. CAUSE: \"What do you think is causing the symptoms?\"      Just two spots in the vaginal area that hurt.   5. OTHER SYMPTOMS: \"Do you have any other symptoms?\" (e.g., blood in urine, fever, flank pain, pain with urination)      none  6. PREGNANCY: \"Is there any chance you are pregnant?\" \"When was your last menstrual period?\"      N/a    Protocols used: Urinary Symptoms-A-OH    "

## 2025-07-16 NOTE — TELEPHONE ENCOUNTER
Future Appointments 7/16/2025 - 1/12/2026        Date Visit Type Length Department Provider     8/6/2025 12:10 PM OFFICE VISIT 20 min [facility]OB Collette Chou MD    Location Instructions:     [facility] is in [address] of the [facility] at [address] This is near the [address] exit off of [place] [address]. From the exit, turn east on [address], then north on [address]. Free lot parking is available. Through the main entrance, [address] is to the right on the first floor.              11/11/2025 10:40 AM PREVENTATIVE ADULT 40 min [facility] Collette Chou MD    Location Instructions:     [facility] is in [address] of the [facility] Building at [address] This is near the [address] exit off of [place] [address]. From the exit, turn east on [address], then north on [address]. Free lot parking is available. Through the main entrance, [address] is to the right on the first floor.                   Spoke to patient. Having UTI like symptoms. Please triage patient to see if she needs to be seen sooner. Thank you.

## 2025-07-17 ENCOUNTER — ANCILLARY PROCEDURE (OUTPATIENT)
Dept: GENERAL RADIOLOGY | Facility: CLINIC | Age: 60
End: 2025-07-17
Attending: NURSE PRACTITIONER
Payer: COMMERCIAL

## 2025-07-17 ENCOUNTER — OFFICE VISIT (OUTPATIENT)
Dept: FAMILY MEDICINE | Facility: CLINIC | Age: 60
End: 2025-07-17
Payer: COMMERCIAL

## 2025-07-17 VITALS
HEART RATE: 85 BPM | OXYGEN SATURATION: 97 % | DIASTOLIC BLOOD PRESSURE: 63 MMHG | RESPIRATION RATE: 18 BRPM | TEMPERATURE: 97.9 F | SYSTOLIC BLOOD PRESSURE: 91 MMHG | WEIGHT: 175 LBS | HEIGHT: 67 IN | BODY MASS INDEX: 27.47 KG/M2

## 2025-07-17 DIAGNOSIS — R07.9 CHEST PAIN, UNSPECIFIED TYPE: Primary | ICD-10-CM

## 2025-07-17 DIAGNOSIS — R07.9 CHEST PAIN, UNSPECIFIED TYPE: ICD-10-CM

## 2025-07-17 DIAGNOSIS — R06.09 DYSPNEA ON EXERTION: ICD-10-CM

## 2025-07-17 DIAGNOSIS — N89.8 VAGINAL IRRITATION: ICD-10-CM

## 2025-07-17 DIAGNOSIS — R35.0 URINARY FREQUENCY: ICD-10-CM

## 2025-07-17 DIAGNOSIS — R06.09 DYSPNEA ON EXERTION: Primary | ICD-10-CM

## 2025-07-17 DIAGNOSIS — Z12.4 CERVICAL CANCER SCREENING: ICD-10-CM

## 2025-07-17 LAB
ALBUMIN UR-MCNC: 30 MG/DL
APPEARANCE UR: CLEAR
ATRIAL RATE - MUSE: 78 BPM
BACTERIA #/AREA URNS HPF: ABNORMAL /HPF
BILIRUB UR QL STRIP: ABNORMAL
CLUE CELLS: ABNORMAL
COLOR UR AUTO: YELLOW
D DIMER PPP FEU-MCNC: <0.27 UG/ML FEU (ref 0–0.5)
DIASTOLIC BLOOD PRESSURE - MUSE: NORMAL MMHG
GLUCOSE UR STRIP-MCNC: NEGATIVE MG/DL
HGB UR QL STRIP: NEGATIVE
HPV HR 12 DNA CVX QL NAA+PROBE: NEGATIVE
HPV16 DNA CVX QL NAA+PROBE: NEGATIVE
HPV18 DNA CVX QL NAA+PROBE: NEGATIVE
HUMAN PAPILLOMA VIRUS FINAL DIAGNOSIS: NORMAL
INTERPRETATION ECG - MUSE: NORMAL
KETONES UR STRIP-MCNC: NEGATIVE MG/DL
LEUKOCYTE ESTERASE UR QL STRIP: NEGATIVE
NITRATE UR QL: NEGATIVE
P AXIS - MUSE: 58 DEGREES
PH UR STRIP: 5.5 [PH] (ref 5–8)
PR INTERVAL - MUSE: 164 MS
QRS DURATION - MUSE: 86 MS
QT - MUSE: 374 MS
QTC - MUSE: 426 MS
R AXIS - MUSE: 47 DEGREES
RBC #/AREA URNS AUTO: ABNORMAL /HPF
SP GR UR STRIP: 1.02 (ref 1–1.03)
SQUAMOUS #/AREA URNS AUTO: ABNORMAL /LPF
SYSTOLIC BLOOD PRESSURE - MUSE: NORMAL MMHG
T AXIS - MUSE: 43 DEGREES
TRICHOMONAS, WET PREP: ABNORMAL
TROPONIN T SERPL HS-MCNC: 10 NG/L
UROBILINOGEN UR STRIP-ACNC: 0.2 E.U./DL
VENTRICULAR RATE- MUSE: 78 BPM
WBC #/AREA URNS AUTO: ABNORMAL /HPF
WBC'S/HIGH POWER FIELD, WET PREP: ABNORMAL
YEAST, WET PREP: ABNORMAL

## 2025-07-17 ASSESSMENT — PAIN SCALES - GENERAL: PAINLEVEL_OUTOF10: NO PAIN (0)

## 2025-07-17 NOTE — PROGRESS NOTES
Assessment and Plan:     Chest pain, unspecified type  Dyspnea on exertion  Patient had new onset of chest pain which has been intermittent for 3 months.  She has had some shortness of breath with exertion.  The differential diagnosis of chest pain is broad and includes life threatening etiologies such as ACS, MI, PE, and acute aortic dissection. Other causes may include asthma, COPD, pneumonia, pneumothorax, chest wall source, pericarditis, pleurisy, acid reflux, anxiety, myofascial pain, and esophageal spasm.  EKG shows normal sinus rhythm.  Will have cardiology review.  Will obtain troponin.  If elevated, recommend ER evaluation.  Chest x-ray shows no acute infiltrate, mass, cardiomegaly.  Will have radiology review.  Will obtain D-dimer to rule out PE.  Further plans pending the results.  May consider stress echocardiogram.  - EKG 12-lead, tracing only  - XR Chest 2 Views  - Troponin T, High Sensitivity  - D dimer, quantitative  - Troponin T, High Sensitivity  - D dimer, quantitative    Urinary frequency  Urinalysis ordered showing small amount of protein.  No leukocytes or blood or is present.  Urine culture ordered due to extent of symptoms.  Will rule out urinary tract infection.  Discussed that atrophic vaginitis or lichen sclerosus may be contributing.  Recommend over-the-counter Replens to assist with this. Will refer to urogynecology for further evaluation.   - UA with Microscopic reflex to Culture - Clinic Collect  - UA Microscopic with Reflex to Culture    Vaginal irritation  Wet prep is negative.  Discussed that atrophic vaginitis or lichen sclerosus may be contributing.  Recommend over-the-counter Replens to assist with this.  May consider Estrace cream if Replens does not provide assistance.  - Wet prep - Clinic Collect    Cervical cancer screening  Pap smear obtained per patient request.  - HPV and Gynecologic Cytology Panel - Recommended Age 30-65 Years        Subjective:     Iman is a 59 year  old female presenting to the clinic for multiple concerns today.  Patient has been experiencing intermittent chest pain for 3 months.  This has been occurring every 3 weeks and last for 45 minutes.  She does not take any medication.  She feels as though she was punched in the chest.  She describes this as an ache.  It has been occurring at rest.  She has been experiencing shortness of breath with activity over the past year.  She denies recent travel.  Her paternal grandfather and father had heart disease.  Her mother had a PE.  Patient is concerned of urinary frequency and urgency for the past 1-1/2 years.  This worsened within the past week.  She is urinating small amounts every 30 minutes.  She feels as though there is a cut around her vagina.  This stings with urination.  She has a history of eczema and is unsure if this is flaring in the area.  She has not been sexually active for multiple years.  She has no concerns for STDs.  She has noticed a vaginal odor.  Patient requests a Pap smear today.  She denies history of abnormal Paps.    Reviewof Systems: A complete 14 point review of systems was obtained and is negative or as stated in the history of present illness.    Social History     Socioeconomic History    Marital status:      Spouse name: Not on file    Number of children: Not on file    Years of education: Not on file    Highest education level: Not on file   Occupational History    Not on file   Tobacco Use    Smoking status: Former     Current packs/day: 0.50     Average packs/day: 0.5 packs/day for 3.0 years (1.5 ttl pk-yrs)     Types: Cigarettes     Passive exposure: Past    Smokeless tobacco: Never   Vaping Use    Vaping status: Never Used   Substance and Sexual Activity    Alcohol use: Yes     Comment: Once a week- light drinker    Drug use: Not Currently     Types: Marijuana     Comment: Not since 2019    Sexual activity: Not Currently     Partners: Male     Birth control/protection:  Condom   Other Topics Concern    Parent/sibling w/ CABG, MI or angioplasty before 65F 55M? No   Social History Narrative    Not on file     Social Drivers of Health     Financial Resource Strain: Low Risk  (8/14/2024)    Financial Resource Strain     Within the past 12 months, have you or your family members you live with been unable to get utilities (heat, electricity) when it was really needed?: No   Food Insecurity: Low Risk  (8/14/2024)    Food Insecurity     Within the past 12 months, did you worry that your food would run out before you got money to buy more?: No     Within the past 12 months, did the food you bought just not last and you didn t have money to get more?: No   Transportation Needs: Low Risk  (8/14/2024)    Transportation Needs     Within the past 12 months, has lack of transportation kept you from medical appointments, getting your medicines, non-medical meetings or appointments, work, or from getting things that you need?: No   Physical Activity: Insufficiently Active (8/14/2024)    Exercise Vital Sign     Days of Exercise per Week: 3 days     Minutes of Exercise per Session: 20 min   Stress: Stress Concern Present (8/14/2024)    [employer] [place] [employer] - Occupational Stress Questionnaire     Feeling of Stress : Very much   Social Connections: Unknown (8/14/2024)    Social Connection and Isolation Panel [NHANES]     Frequency of Communication with Friends and Family: Not on file     Frequency of Social Gatherings with Friends and Family: Once a week     Attends L[employer]-day Services: Not on file     Active Member of Clubs or Organizations: Not on file     Attends Club or Organization Meetings: Not on file     Marital Status: Not on file   Interpersonal Safety: Low Risk  (7/17/2025)    Interpersonal Safety     Do you feel physically and emotionally safe where you currently live?: Yes     Within the past 12 months, have you been hit, slapped, kicked or otherwise physically hurt by  "someone?: No     Within the past 12 months, have you been humiliated or emotionally abused in other ways by your partner or ex-partner?: No   Housing Stability: Low Risk  (8/14/2024)    Housing Stability     Do you have housing? 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    Diverticular disease 03/28/2025    Polyp of colon 03/28/2025     Resolved Ambulatory Problems     Diagnosis Date Noted    No Resolved Ambulatory Problems     Past Medical History:   Diagnosis Date    Depressive disorder 2020       Family History   Problem Relation Age of Onset    Cerebrovascular Disease Mother     Asthma Mother         COPD    Obesity Mother         Struggled with weight during adulthood nut not severe    Osteoarthritis Father         severe    Obesity Father         Struggled with weight towards end of life but not severe    Diverticulitis Father     Other - See Comments Father         back surgeries    No Known Problems Sister     No Known Problems Brother     Stomach Cancer Maternal Grandmother     Asthma Maternal Grandfather     Rheumatoid Arthritis Paternal Grandmother     Coronary Artery Disease Paternal Grandfather     Breast Cancer Cousin         early 40s    Brain Cancer Maternal Uncle         40s       Objective:     BP 91/63   Pulse 85   Temp 97.9  F (36.6  C)   Resp 18   Ht 1.689 m (5' 6.5\")   Wt 79.4 kg (175 lb)   SpO2 97%   BMI 27.82 kg/m      Patient is alert, in no obvious distress.   Skin: Warm, dry.    Lungs:  Clear to auscultation. Respirations even and unlabored.  No wheezing or rales noted.   Heart:  Regular rate and rhythm.  No murmurs, S3, S4, gallops, or rubs.    Abdomen: Soft, nontender.  No organomegaly. Bowel sounds normoactive. No guarding or masses noted. "   :  External genitalia shows white pigmentation around her urethra.  Labia is mildly swollen bilaterally.  No vaginal discharge is present.  No lesions or cervical motion tenderness is present.       LABORATORY: I ordered and personally reviewed an EKG showing normal sinus rhythm.    I ordered and personally reviewed chest x-rays showing no obvious infiltrate or mass.  Will have radiology review.                Answers submitted by the patient for this visit:  General Questionnaire (Submitted on 7/17/2025)  Chief Complaint: Chronic problems general questions HPI Form  What is the reason for your visit today? : extreme urgency to urinate, new chest pain, frequent burn with urinating and feels like a cut down there  How many servings of fruits and vegetables do you eat daily?: 2-3  On average, how many sweetened beverages do you drink each day (Examples: soda, juice, sweet tea, etc.  Do NOT count diet or artificially sweetened beverages)?: 1  How many minutes a day do you exercise enough to make your heart beat faster?: 10 to 19  How many days a week do you exercise enough to make your heart beat faster?: 4  How many days per week do you miss taking your medication?: 1  Questionnaire about: Chronic problems general questions HPI Form (Submitted on 7/17/2025)  Chief Complaint: Chronic problems general questions HPI Form

## 2025-07-19 LAB — BACTERIA UR CULT: NORMAL

## 2025-07-21 ENCOUNTER — PATIENT OUTREACH (OUTPATIENT)
Dept: CARE COORDINATION | Facility: CLINIC | Age: 60
End: 2025-07-21
Payer: COMMERCIAL

## 2025-07-23 LAB
BKR AP ASSOCIATED HPV REPORT: NORMAL
BKR LAB AP GYN ADEQUACY: NORMAL
BKR LAB AP GYN INTERPRETATION: NORMAL
BKR LAB AP PREVIOUS ABNORMAL: NORMAL
PATH REPORT.COMMENTS IMP SPEC: NORMAL
PATH REPORT.COMMENTS IMP SPEC: NORMAL
PATH REPORT.RELEVANT HX SPEC: NORMAL

## 2025-10-24 ENCOUNTER — PRE VISIT (OUTPATIENT)
Dept: UROLOGY | Facility: CLINIC | Age: 60
End: 2025-10-24